# Patient Record
Sex: MALE | Race: WHITE | NOT HISPANIC OR LATINO | Employment: OTHER | URBAN - METROPOLITAN AREA
[De-identification: names, ages, dates, MRNs, and addresses within clinical notes are randomized per-mention and may not be internally consistent; named-entity substitution may affect disease eponyms.]

---

## 2018-12-11 ENCOUNTER — RECORDS - HEALTHEAST (OUTPATIENT)
Dept: LAB | Facility: CLINIC | Age: 63
End: 2018-12-11

## 2018-12-11 LAB
ALBUMIN SERPL-MCNC: 4 G/DL (ref 3.5–5)
ALP SERPL-CCNC: 51 U/L (ref 45–120)
ALT SERPL W P-5'-P-CCNC: 25 U/L (ref 0–45)
ANION GAP SERPL CALCULATED.3IONS-SCNC: 10 MMOL/L (ref 5–18)
AST SERPL W P-5'-P-CCNC: 24 U/L (ref 0–40)
BILIRUB SERPL-MCNC: 0.7 MG/DL (ref 0–1)
BUN SERPL-MCNC: 16 MG/DL (ref 8–22)
CALCIUM SERPL-MCNC: 9.9 MG/DL (ref 8.5–10.5)
CHLORIDE BLD-SCNC: 103 MMOL/L (ref 98–107)
CO2 SERPL-SCNC: 25 MMOL/L (ref 22–31)
CREAT SERPL-MCNC: 0.72 MG/DL (ref 0.7–1.3)
GFR SERPL CREATININE-BSD FRML MDRD: >60 ML/MIN/1.73M2
GLUCOSE BLD-MCNC: 102 MG/DL (ref 70–125)
LIPASE SERPL-CCNC: 27 U/L (ref 0–52)
POTASSIUM BLD-SCNC: 4.1 MMOL/L (ref 3.5–5)
PROT SERPL-MCNC: 6.8 G/DL (ref 6–8)
SODIUM SERPL-SCNC: 138 MMOL/L (ref 136–145)

## 2019-04-25 ENCOUNTER — RECORDS - HEALTHEAST (OUTPATIENT)
Dept: LAB | Facility: CLINIC | Age: 64
End: 2019-04-25

## 2019-04-26 LAB
ALBUMIN SERPL-MCNC: 4.4 G/DL (ref 3.5–5)
ALP SERPL-CCNC: 57 U/L (ref 45–120)
ALT SERPL W P-5'-P-CCNC: 24 U/L (ref 0–45)
ANION GAP SERPL CALCULATED.3IONS-SCNC: 13 MMOL/L (ref 5–18)
AST SERPL W P-5'-P-CCNC: 24 U/L (ref 0–40)
BILIRUB SERPL-MCNC: 0.6 MG/DL (ref 0–1)
BUN SERPL-MCNC: 25 MG/DL (ref 8–22)
CALCIUM SERPL-MCNC: 9.8 MG/DL (ref 8.5–10.5)
CHLORIDE BLD-SCNC: 107 MMOL/L (ref 98–107)
CHOLEST SERPL-MCNC: 209 MG/DL
CO2 SERPL-SCNC: 21 MMOL/L (ref 22–31)
CREAT SERPL-MCNC: 0.8 MG/DL (ref 0.7–1.3)
FASTING STATUS PATIENT QL REPORTED: NO
GFR SERPL CREATININE-BSD FRML MDRD: >60 ML/MIN/1.73M2
GLUCOSE BLD-MCNC: 122 MG/DL (ref 70–125)
HDLC SERPL-MCNC: 32 MG/DL
LDLC SERPL CALC-MCNC: 108 MG/DL
POTASSIUM BLD-SCNC: 3.7 MMOL/L (ref 3.5–5)
PROT SERPL-MCNC: 7.1 G/DL (ref 6–8)
PSA SERPL-MCNC: 0.7 NG/ML (ref 0–4.5)
SODIUM SERPL-SCNC: 141 MMOL/L (ref 136–145)
TRIGL SERPL-MCNC: 344 MG/DL
TSH SERPL DL<=0.005 MIU/L-ACNC: 1.28 UIU/ML (ref 0.3–5)

## 2019-04-29 LAB — 25(OH)D3 SERPL-MCNC: 39.7 NG/ML (ref 30–80)

## 2020-05-13 ENCOUNTER — RECORDS - HEALTHEAST (OUTPATIENT)
Dept: LAB | Facility: CLINIC | Age: 65
End: 2020-05-13

## 2020-05-13 LAB
ANION GAP SERPL CALCULATED.3IONS-SCNC: 9 MMOL/L (ref 5–18)
BUN SERPL-MCNC: 27 MG/DL (ref 8–22)
CALCIUM SERPL-MCNC: 9.5 MG/DL (ref 8.5–10.5)
CHLORIDE BLD-SCNC: 104 MMOL/L (ref 98–107)
CO2 SERPL-SCNC: 28 MMOL/L (ref 22–31)
CREAT SERPL-MCNC: 0.85 MG/DL (ref 0.7–1.3)
GFR SERPL CREATININE-BSD FRML MDRD: >60 ML/MIN/1.73M2
GLUCOSE BLD-MCNC: 94 MG/DL (ref 70–125)
POTASSIUM BLD-SCNC: 4.9 MMOL/L (ref 3.5–5)
SODIUM SERPL-SCNC: 141 MMOL/L (ref 136–145)

## 2020-09-29 ENCOUNTER — AMBULATORY - HEALTHEAST (OUTPATIENT)
Dept: SURGERY | Facility: CLINIC | Age: 65
End: 2020-09-29

## 2020-09-29 DIAGNOSIS — Z11.59 ENCOUNTER FOR SCREENING FOR OTHER VIRAL DISEASES: ICD-10-CM

## 2020-10-08 ENCOUNTER — RECORDS - HEALTHEAST (OUTPATIENT)
Dept: LAB | Facility: CLINIC | Age: 65
End: 2020-10-08

## 2020-10-08 LAB
ANION GAP SERPL CALCULATED.3IONS-SCNC: 11 MMOL/L (ref 5–18)
BUN SERPL-MCNC: 17 MG/DL (ref 8–22)
CALCIUM SERPL-MCNC: 9.5 MG/DL (ref 8.5–10.5)
CHLORIDE BLD-SCNC: 102 MMOL/L (ref 98–107)
CO2 SERPL-SCNC: 25 MMOL/L (ref 22–31)
CREAT SERPL-MCNC: 1.03 MG/DL (ref 0.7–1.3)
GFR SERPL CREATININE-BSD FRML MDRD: >60 ML/MIN/1.73M2
GLUCOSE BLD-MCNC: 85 MG/DL (ref 70–125)
POTASSIUM BLD-SCNC: 4.9 MMOL/L (ref 3.5–5)
SODIUM SERPL-SCNC: 138 MMOL/L (ref 136–145)

## 2020-10-09 RX ORDER — LOSARTAN POTASSIUM 50 MG/1
50 TABLET ORAL DAILY
Status: SHIPPED | COMMUNITY
Start: 2020-10-09 | End: 2022-08-19

## 2020-10-09 RX ORDER — ATORVASTATIN CALCIUM 20 MG/1
20 TABLET, FILM COATED ORAL AT BEDTIME
Status: SHIPPED | COMMUNITY
Start: 2020-10-09

## 2020-10-09 RX ORDER — GABAPENTIN 300 MG/1
300 CAPSULE ORAL 3 TIMES DAILY
Status: SHIPPED | COMMUNITY
Start: 2020-10-09

## 2020-10-09 RX ORDER — ATENOLOL 50 MG/1
50 TABLET ORAL DAILY
Status: SHIPPED | COMMUNITY
Start: 2020-10-09 | End: 2022-08-19

## 2020-10-09 ASSESSMENT — MIFFLIN-ST. JEOR
SCORE: 1912.3
SCORE: 1912.3

## 2020-10-12 ENCOUNTER — AMBULATORY - HEALTHEAST (OUTPATIENT)
Dept: LAB | Facility: CLINIC | Age: 65
End: 2020-10-12

## 2020-10-12 DIAGNOSIS — Z11.59 ENCOUNTER FOR SCREENING FOR OTHER VIRAL DISEASES: ICD-10-CM

## 2020-10-13 LAB
SARS-COV-2 PCR COMMENT: NORMAL
SARS-COV-2 RNA SPEC QL NAA+PROBE: NEGATIVE
SARS-COV-2 VIRUS SPECIMEN SOURCE: NORMAL

## 2020-10-14 ENCOUNTER — ANESTHESIA - HEALTHEAST (OUTPATIENT)
Dept: SURGERY | Facility: CLINIC | Age: 65
End: 2020-10-14

## 2020-10-14 ENCOUNTER — SURGERY - HEALTHEAST (OUTPATIENT)
Dept: SURGERY | Facility: CLINIC | Age: 65
End: 2020-10-14

## 2020-10-14 ENCOUNTER — HOSPITAL ENCOUNTER (INPATIENT)
Dept: MEDSURG UNIT | Facility: CLINIC | Age: 65
Discharge: HOME | End: 2020-10-17
Attending: ORTHOPAEDIC SURGERY | Admitting: ORTHOPAEDIC SURGERY

## 2020-10-14 ENCOUNTER — COMMUNICATION - HEALTHEAST (OUTPATIENT)
Dept: SCHEDULING | Facility: CLINIC | Age: 65
End: 2020-10-14

## 2020-10-14 DIAGNOSIS — M48.061 SPINAL STENOSIS OF LUMBAR REGION AT MULTIPLE LEVELS: ICD-10-CM

## 2020-10-14 RX ORDER — NAPROXEN 500 MG/1
500 TABLET ORAL 2 TIMES DAILY WITH MEALS
Refills: 0 | Status: SHIPPED
Start: 2020-10-19 | End: 2022-08-19

## 2020-10-14 ASSESSMENT — MIFFLIN-ST. JEOR
SCORE: 1889.17
SCORE: 1889.17

## 2020-10-15 ENCOUNTER — AMBULATORY - HEALTHEAST (OUTPATIENT)
Dept: OTHER | Facility: CLINIC | Age: 65
End: 2020-10-15

## 2020-10-15 RX ORDER — POLYETHYLENE GLYCOL 3350 17 G/17G
17 POWDER, FOR SOLUTION ORAL 2 TIMES DAILY
Refills: 0 | Status: SHIPPED | COMMUNITY
Start: 2020-10-15 | End: 2022-08-19

## 2020-10-15 RX ORDER — BACLOFEN 5 MG/1
5-10 TABLET ORAL EVERY 6 HOURS PRN
Qty: 30 TABLET | Refills: 0 | Status: SHIPPED | OUTPATIENT
Start: 2020-10-15 | End: 2022-08-19

## 2020-10-15 RX ORDER — OXYCODONE HYDROCHLORIDE 5 MG/1
5-10 TABLET ORAL EVERY 4 HOURS PRN
Qty: 42 TABLET | Refills: 0 | Status: SHIPPED | OUTPATIENT
Start: 2020-10-15 | End: 2022-08-19

## 2020-10-15 RX ORDER — AMOXICILLIN 250 MG
1 CAPSULE ORAL 2 TIMES DAILY
Refills: 0 | Status: SHIPPED | COMMUNITY
Start: 2020-10-15 | End: 2022-08-19

## 2020-10-15 RX ORDER — ACETAMINOPHEN 500 MG
500 TABLET ORAL EVERY 4 HOURS
Refills: 0 | Status: SHIPPED | COMMUNITY
Start: 2020-10-15

## 2020-10-16 ASSESSMENT — MIFFLIN-ST. JEOR
SCORE: 1885.09
SCORE: 1885.09

## 2021-05-26 ENCOUNTER — RECORDS - HEALTHEAST (OUTPATIENT)
Dept: ADMINISTRATIVE | Facility: CLINIC | Age: 66
End: 2021-05-26

## 2021-06-05 VITALS
WEIGHT: 244 LBS | WEIGHT: 244 LBS | HEIGHT: 70 IN | BODY MASS INDEX: 34.93 KG/M2 | BODY MASS INDEX: 34.93 KG/M2 | HEIGHT: 70 IN

## 2021-06-12 NOTE — PROGRESS NOTES
Physical Therapy       10/15/20 0993   Visit Specifics   Eval Type Initial eval   Inital PT Consult 10/15/20   Bed/Tabs/Pad Alarm Applied Not applicable  (handoff to OT)   Subjective Patient Comments Pleasant, cooperative.    General   Onset date 10/14/20   Additional Pertinent History s/p L3-4, 4-5 fusion and laminectomies    Chart Reviewed Yes   PT/OT Patient/Caregiver Stated Goals return home    Family/Caregiver Present Yes   Treatment Time   Gait Training 15   Precautions   Weight Bearing Status wbat    General Precautions Back brace when OOB;Spinal  (Corset brace, hemovac )   Home Living   Type of Home House   Home Layout One level;Performs ADLs on one level;Able to live on main level with bedroom/bathroom;Stairs to enter with rails  (2 PITO)   Bathroom Shower/Tub Walk-in shower   Bathroom Toilet Standard   Bathroom Equipment Toilet raiser   Mobility Equipment Walker-front wheeled;Cane   Prior Status   Independent With All ADL's/IADL's   Needs Assistance With Laundry   Prior Device Use None of the above   Indoor Mobility Independent   Lives With Spouse   Receives Help From Family   Device Used No   Cognition   Overall Cognitive Status WFL   RLE Assessment   RLE Assessment WFL   LLE Assessment   LLE Assessment WFL   Bed Mobility   Supine to Sit Stand by assistance;Log roll   Bed Mobility Comments Max cues for log roll technique.    Transfer    Sit To Stand CGA   Stand To Sit CGA   Transfer Comments Cues for proper hand placement and safety. Pt able to independently luis corset sitting eob.    Ambulation    Weight Bearing Status wbat    Distance (ft)  175', 50'    Assistance CGA   Assistive Device Rolling walker   Verbal Cues Attention to task;Attention to direction   Quality of Gait/Comment Slow, but steady gait.    Pattern Decreased pace   Endurance Deficit   Endurance Deficit No   Balance   Sitting Balance Standby   Standing Balance CGA   Plan   Treatment/Interventions Functional transfer training;Gait/stair  training;Home exercise program;Neuromuscular re-ed;Strengthening/ROM   PT Frequency 2x/day   Assessment   Prognosis Good   Problem List Decreased mobility   Barriers to Discharge None   Recommendation   PT Discharge Recommendation Home with assist   PT Equipment Recommendation Rolling walker / FWW   Treatment Suggestions for Next Session progress transfers, amb as antoinette, stairs    PT Care Plan REVIEWED DAILY Yes, goals remain appropriate   PT Handouts Given spine precautions     Valerie Francois, PT

## 2021-06-12 NOTE — ANESTHESIA CARE TRANSFER NOTE
Last vitals:   Vitals:    10/14/20 1634   BP: 106/63   Pulse: 66   Resp: 18   Temp: 36.1  C (96.9  F)   SpO2: 95%     Patient's level of consciousness is drowsy  Spontaneous respirations: yes  Maintains airway independently: yes  Dentition unchanged: yes  Oropharynx: oropharynx clear of all foreign objects    QCDR Measures:  ASA# 20 - Surgical Safety Checklist: WHO surgical safety checklist completed prior to induction    PQRS# 430 - Adult PONV Prevention: 4558F - Pt received => 2 anti-emetic agents (different classes) preop & intraop  ASA# 8 - Peds PONV Prevention: NA - Not pediatric patient, not GA or 2 or more risk factors NOT present  PQRS# 424 - Nora-op Temp Management: 4559F - At least one body temp DOCUMENTED => 35.5C or 95.9F within required timeframe  PQRS# 426 - PACU Transfer Protocol: - Transfer of care checklist used  ASA# 14 - Acute Post-op Pain: ASA14B - Patient did NOT experience pain >= 7 out of 10

## 2021-06-12 NOTE — ANESTHESIA PREPROCEDURE EVALUATION
Anesthesia Evaluation      Patient summary reviewed   No history of anesthetic complications     Airway   Mallampati: II  Neck ROM: full   Pulmonary - normal exam                          Cardiovascular - normal exam  (+) hypertension, ,      Neuro/Psych    (+) neuromuscular disease,      Endo/Other    (+) obesity,      GI/Hepatic/Renal    (+) GERD,             Dental - normal exam                        Anesthesia Plan  Planned anesthetic: general endotracheal    ASA 2   Induction: intravenous   Anesthetic plan and risks discussed with: patient  Anesthesia plan special considerations: antiemetics, dexmedetomidine  Post-op plan: routine recovery

## 2021-06-12 NOTE — PROGRESS NOTES
Orthopedic Progress Note      Assessment: 3 Days Post-Op  S/P LEFT LUMBAR 3-4 AND RIGHT LUMBAR 4-5 TRANSFORAMINAL LUMBAR INTERBODY FUSION WITH LAMINECTOMY    Plan:   - Continue PT/OT  - Weightbearing status: WBAT  -Lumbar corset when out of bed   -Discontinue drain per parameters/postop orders    - Anticoagulation:  SCDs, sarai stockings and early ambulation.  - Discharge planning: Plan for discharge home today if pain, bowels, and PT are going well.        Subjective:  Pain: mild  Nausea, Vomiting:  No  Lightheadedness, Dizziness:  No  Neuro:  Patient denies new onset numbness or paresthesias    Patient is resting in bed.  Patient reports he had a BM.  Still some bowel irritation, but overall feeling pretty good.  He feels he is doing well with PT.  Reports little more numbness feeling involving left thigh.       Objective:  Vitals:    10/17/20 0406   BP: 135/86   Pulse: 75   Resp: 18   Temp: 98.5  F (36.9  C)   SpO2: 96%     The patient is A&Ox3. Appears comfortable.   Sensation is intact.  Dorsiflexion and plantar flexion is intact.  Dorsalis pedis pulse intact.  Calves are soft and non-tender. Negative Prateek's.  The incision is covered. Dressing C/D/I.  Dressing looks good.  No drainage.      Drain is in place.      Pertinent Labs   Lab Results: personally reviewed.   No results found for: INR, PROTIME  No results found for: WBC, HGB, HCT, MCV, PLT  Lab Results   Component Value Date     10/08/2020    K 4.9 10/08/2020     10/08/2020    CO2 25 10/08/2020         Report completed by:  Tomi Dueñas PA-C  Date: 10/17/2020  Time: 6:53 AM

## 2021-06-12 NOTE — PLAN OF CARE
Problem: Occupational Therapy  Goal: OT Goals  Description: Acute care goals to be addressed by 10/16/2020 in order to maximize I with ADLs  -Pt will complete LB dressing using AE PRN to increase I with ADLs  -Pt will complete functional mobility in room and bath with SBA using FWW PRN to increase I with ADLs  Goals initiated by Selene Reyna on 10/15/2020    Outcome: Completed    Occupational Therapy Discharge Summary    Date of OT Discharge: 10/15/2020    Refer to daily doc flowsheet for equipment issued and current functional status.  Discharge Destination: Remains in hospital  Discharge Comments: Recommend home with family assist as needed.  OT goals met.      10/15/2020 by Selene Reyna

## 2021-06-12 NOTE — OP NOTE
DATE OF SERVICE: 10/14/2020     PREOPERATIVE DIAGNOSES:   1.  Severe right L4 foraminal stenosis  2.  Severe left L3 foraminal stenosis  3.  Central spinal stenosis, L3-L5  4.  Degenerative disc disease   5.  Failure of conservative measures      POSTOPERATIVE DIAGNOSES:   1.  Severe right L4 foraminal stenosis  2.  Severe left L3 foraminal stenosis  3.  Central spinal stenosis, L3-L5  4.  Degenerative disc disease   5.  Failure of conservative measures      PROCEDURE:   1. Right-sided transforaminal/transfacet decompression of the exiting L4 and traversing L5 nerve roots.   2. Transforaminal lumbar interbody fusion L4-5 with autograft bone, [employer] Elevate interbody 28 x 8 to 12.   3. Left-sided transforaminal/transfacet decompression of the exiting L3 and traversing L4 nerve roots.   4. Transforaminal lumbar interbody fusion L4-5 with autograft bone, Medtronic Elevate interbody 28 x 8 to 12.   5. Posterior lateral fusion bilaterally at L4-5 and bilateral posterior lateral fusion at L3-4.   6. Segmental pedicle screw fixation in the pedicles of L3, L4, and L5 bilaterally  7. Complete laminectomy with lateral recess decompression from L3-5   8. O arm with intra operative navigation     [employer] navigated pedicle screws systems.      SURGEON: Dr. Jadon Johnson.      ASSISTANT: Kirt Mayberry PA-C, who was needed for patient positioning, soft tissue   retraction, patient safety and assistance with closure of the wound.  He was vital in the protection of the nerve roots as well as the dura.  This could only be performed by a surgical PA trained in spine     ESTIMATED BLOOD LOSS: 1200 mL, returned 560 mL via Cell Saver      DRAINS: Hemovac      COMPLICATIONS: None perceived.      SPECIMENS SENT: None.      HISTORY OF PRESENT ILLNESS AND INDICATIONS FOR PROCEDURE:   This is a pleasant 65 year old male that presented with severe bilateral leg pain and buttock pain, right greater than left.  MRI  demonstrated degenerative disc disease with severe spinal stenosis from L3-L5 with right L4 and left L3 foraminal stenosis.  He also had severe multi level degenerative disc disease.  He underwent non-operative treatment with injections as well as therapy with no improvement long term.  We discussed because of this that he was a candidate for surgery.  I discussed that a fusion would give him more reliable results given his severe degenerative disc disease with foraminal collapse.  He wished to proceed with the fusion.  We discussed the risks and benefits which include but are not limited to bleeding, infection, damage to the nerve or dura, failure of procedure to provide pain relief, foot drop, iatrogenic instability, pseudoarthrosis, need for additional procedures, and risks associated with anesthesia.  The intention of the surgery is to treat his leg pain and will not reliably treat any back pain. He was completely clear on this.  Following this, he would like to proceed with the fusion.       DESCRIPTION OF PROCEDURE     Therefore, the patient was brought to the operating room. He was intubated via general endotracheal anesthetic and placed on a Nima table with a Gualberto frame, care taken to pad all of his bony prominences. Pause for the Cause was performed correctly identifying the patient, procedure, proposed plan and radiographs and all were in agreement. We then dissected down from the L3 to the L5 hemilamina bilaterally. We dissected over the facet joints at L3-4 and L4-5 bilaterally.  In addition to this, the transverse process was visualized at the L3, L4, and L5 level.  O arm was brought in and a tracer was placed on the L5 spinous process.  An intraoperative CT scan was taken.       We then turned our attention to placing pedicle screws, first using a Midas Madi bur, then a pedicle probe, then a Guardado probe to palpate the cannulated pedicle. We then tapped the holes to 5.5 at L5 and 4.5 mm at L3 and L4.   We then used the Guardado probe again to palpate all 4 quadrants of our cannulated pedicle. We then placed 5.5x50 screws bilaterally at L3 and 5.5x50 screws bilaterally at L4 and finally 6.5x50 screws bilaterally at L5.   A subsequent O arm spin was performed and demonstrated the screws in good position.     We then turned our attention to the decompression and interbody.  A laminotomy was made.  The right L4 pars was scored allowing for the removal of the descending articular process of L4 along with the ascending articular process of L5. This gave us our transforaminal/transfacet decompression of the exiting L4 and traversing L5 nerve root.  We followed that L4 nerve root all the way out. There was clearly no continued neural compression.  At the disk space level, we performed a box annulotomy at L4-5, used a combination of ODC curettes, pituitary rongeurs and Kerrison rongeurs to remove the disk material. We took great care not to dig into the endplates. When all of the disk material that we could remove was taken out, we gently scored the endplates and placed a small Magnifuse along with a piece of felice and 6 cc of autograft bone in the interbody space. We then placed the interbody graft,  a 28 mm length and 8 expanding to 12 mm height, Medtronic Elevate cage. This had good fit and fill.       We then turned our attention to the decompression and interbody on the left.  A laminotomy was made.  The left L3 pars was scored allowing for the removal of the descending articular process of L3 along with the ascending articular process of L4. This gave us our transforaminal/transfacet decompression of the exiting L3 and traversing L4 nerve root.  We followed that L3 nerve root all the way out. There was clearly no continued neural compression.  At the disk space level, we performed a box annulotomy at L3-4, used a combination of ODC curettes, pituitary rongeurs and Kerrison rongeurs to remove the disk material. We took  great care not to dig into the endplates. When all of the disk material that we could remove was taken out, we gently scored the endplates and placed a piece of felice and 6 cc of autograft bone in the interbody space. We then placed the interbody graft,  a 28 mm length and 7 expanding to 11 mm height, Medtronic Elevate cage. This had good fit and fill.       A laminotomy was then made on the left side at L4 and on the right at L3.  The spinous process of L3 and L4 was removed in a piecemeal fashion.  This was then followed by removal of the ligamentum and epidural fat.  From here, we removed the spinous process, bilateral lamina, and medial facets from L3-L5.  The ligamentum was completely freed and removed.  This allowed for a complete central and lateral recess decompression.  There was a fair amount of bleeding.  This was controlled as best as possible with Gel Foam, Surgiflo, and electrocautery.        We then decorticated our transverse processes on the left at L3-L5 and at L3-4 on the right as well as the facet joint and placed additional autograft in the defect.  This was mixed with a medium Felice puck  along with the other Magnifuse bag to assist in posterolateral fusion.       We then placed our connectors and rods and tightened to the 's specifications. We irrigated the wound copiously. Vancomycin was placed over the hardware.  2 Hemovac drains were then placed. The fascia was closed with #1 Vicryl, subcutaneous tissue with 2-0 Vicryl, skin with a 3-0 monocryl.  A sterile dressing was applied. The patient tolerated the procedure well. There were no apparent complications.      He will be weightbearing as tolerated bilateral lower extremities with strict instructions to avoid bending, lifting and twisting over the next 6 weeks. He may have a corset type brace for comfort and have early mobilization and CIARAN stockings for his DVT prophylaxis. He will have 2 grams of Ancef IV q.8 hours x2 doses  for antibiotics or until his drain is removed. Return to see me in 2 weeks, sooner if there are any problems, questions or concerns.     Hardware used:  Medtronic Solera screws and rods with end caps  Medtronic Elevate cage

## 2021-06-12 NOTE — PROGRESS NOTES
Occupational Therapy     10/15/20 1000   Visit Specifics   Eval Type Inital eval   Inital OT Consult  10/15/20   Bed/Tabs/Pad Alarm Applied Not applicable   Treatment Time   ADL Training 25   General   Onset date 10/14/20   Additional Pertinent History spine surgery   Chart Reviewed Yes   PT/OT Patient/Caregiver Stated Goals home   Family/Caregiver Present Yes   Precautions   Weight Bearing Status WBAT   General Precautions Back brace when OOB   Home Living   Type of Home House   Home Layout Able to live on main level with bedroom/bathroom   Bathroom Shower/Tub Walk-in shower   Bathroom Toilet Standard   Bathroom Equipment Toilet raiser   Mobility Equipment Walker-front wheeled   Prior Status   Independent With All ADL's/IADL's   Needs Assistance With Laundry   Lives With Spouse   Receives Help From Family   ADL   Grooming Wash/Dry hands   Wash/Dry Hands SBA;Standing   Toileting All toileting   All Toileting SBA   Kitchen/Functional Mobility SBA;Retrieved from upper cupboard;Retrieved from lower cupboard;Retrieved from fridge;Along counter top   ADL Comments Educated on spine precautions   Activity Tolerance   Endurance Good   Balance   Sitting Balance Independent   Standing Balance Standby   Functional Transfers   Functional Transfers Chair Transfers;Toilet Transfers;Tub Transfers;Car Transfers   Chair Transfers SBA   Toilet Transfers SBA   Tub Transfers SBA   Car Transfers SBA   Transfer Cues Technique;Correct hand placement   Transfer Deficits Pain   Ambulation   Location In clinic;To clinic;In room;To bathroom   Assistance SBA;CGA   Device Rolling Walker   Cognition   Overall Cognitive Status WFL   RUE Assessment   RUE Assessment WFL   LUE Assessment   LUE Assessment WFL   Assessment   Assessment Decreased ADL status;Decreased funtional mobility   Prognosis Good   Treatment/Interventions ADL training;Functional transfer training   OT Frequency 2x/day   Goal Formulation Patient   Recommendations   OT Discharge  Recommendation Home with family support/assistance   Treatment Suggestions for Next Session dressing, bed mobility   OT Care Plan REVIEWED DAILY Yes, goals remain appropriate

## 2021-06-12 NOTE — PLAN OF CARE
Physical Therapy Discharge Summary    Date of PT Discharge: 10/20/2020  Recommended Equipment: FWW  Discharge Destination: Home with assist from family as needed.  Discharge Comments:     Please note that if goals are not fully met the patient is making progress towards established goals, which is documented in flowsheet notes. If further therapy is recommended it is related to documented deficits, and is necessary to maximize functional independence in order for patient to return to previous level of function.      10/17/2020 by Aliya Talamantes, PT    Problem: Physical Therapy  Goal: PT Goals  Description: Patient will demonstrate the following by 10/19/2020, in order to maximize independence with functional mobility to facilitate safe discharge:   -Supine<>sit with head of bed flat, no rail, I, via log roll  -Sit<>stand with fww assistive device, I  -Ambulate 300 feet with fww assistive device, SBA   -Negotiate 2 stairs with single rail, SBA, in order to access home.  -Patient able to verbalize 3/3 spine precautions.  -Improve gait speed by .13 m/s with appropriate AD as needed.    Goals entered on 10/15/2020 by Valerie Francois, PT      Outcome: Completed

## 2021-06-12 NOTE — ANESTHESIA POSTPROCEDURE EVALUATION
Patient: Jamel Noel  Procedure(s):  LEFT LUMBAR 3-4 AND RIGHT LUMBAR 4-5 TRANSFORAMINAL LUMBAR INTERBODY FUSION WITH LAMINECTOMY (Bilateral)  Anesthesia type: general    Patient location: PACU  Last vitals:   Vitals Value Taken Time   BP 99/73 10/14/20 1805   Temp 36.6  C (97.8  F) 10/14/20 1805   Pulse 85 10/14/20 1805   Resp 14 10/14/20 1805   SpO2 95 % 10/14/20 1805     Post vital signs: stable  Level of consciousness: awake and responds to simple questions  Post-anesthesia pain: pain controlled  Post-anesthesia nausea and vomiting: no  Pulmonary: unassisted, return to baseline, face mask  Cardiovascular: stable and blood pressure at baseline  Hydration: adequate  Anesthetic events: no    QCDR Measures:  ASA# 11 - Nora-op Cardiac Arrest: ASA11B - Patient did NOT experience unanticipated cardiac arrest  ASA# 12 - Nora-op Mortality Rate: ASA12B - Patient did NOT die  ASA# 13 - PACU Re-Intubation Rate: ASA13B - Patient did NOT require a new airway mgmt  ASA# 10 - Composite Anes Safety: ASA10A - No serious adverse event    Additional Notes:

## 2021-06-12 NOTE — PLAN OF CARE
Problem: Potential for Ineffective Pain control  Goal: Patient will demonstrate adequate pain control  Outcome: Progressing     Problem: Impaired Physical Mobility  Goal: Mobility is maintained at optimum level per patient situation  Outcome: Progressing     Problem: Potential for respiratory complication  Goal: Patient will maintain patent airway and return to respiratory baseline post-op  Outcome: Progressing  Pt's pain is managed with iv Dilaudid 0.5 mg x 1, plus scheduled Tylenol and Oxycodone. PVR done.

## 2021-06-12 NOTE — PLAN OF CARE
Care Plan  Care Management (CM)      Care Management Goals of the Day: assessment, care progression and discharge planning    Care Progression Reviewed With: Charge Nurse, Medical Doctor (MD), Health Unit Coordinator, (HUC) Nurse Care Manager (RNCM),  Social Work Care Manager (SWCM)    Barriers to Discharge: POD 1 spine-ortho. Drain, pain, PVRs    Discharge Disposition: home    Expected Discharge Date:10/17    Transportation: wife, [place]      Care Coordination Narrative:Home with wife. Patient agrees with home care if recommended.    Care Manager to continue to follow.       Abbreviation  Code:  [facility] Wheel[facility] ([facility]), [facility] ([facility]), Patient (pt), Transitional Care Unit (TCU), Skilled Nursing Facility (SNF), Physical Therapy (PT), Occupational Therapy (OT), Speech Therapy (ST TX), Respiratory Therapy (RT)

## 2021-06-12 NOTE — PLAN OF CARE
Problem: Pain  Goal: Patient's pain/discomfort is manageable  Outcome: Progressing     Problem: Daily Care  Goal: Daily care needs are met  Outcome: Progressing     Problem: Psychosocial Needs  Goal: Demonstrates ability to cope with hospitalization/illness  Outcome: Progressing   Pt's pain is effectively managed with scheduled Tylenol and Oxycodone. Up with standby assist of one and ambulating with walker. No other concerns noted or verbalized. Will continue to monitor.

## 2021-06-12 NOTE — DISCHARGE SUMMARY
ORTHOPEDIC DISCHARGE SUMMARY       Jamel Noel,  1955, MRN [id number]    Admission Date: 10/14/2020  Admission Diagnoses: Lumbar spinal stenosis [M48.061]     Discharge Date: 10/17/2020     Post-operative Day:  POD #3    Reason for Admission: The patient was admitted for the following:  LEFT LUMBAR 3-4 AND RIGHT LUMBAR 4-5 TRANSFORAMINAL LUMBAR INTERBODY FUSION WITH LAMINECTOMY (Bilateral)    BRIEF HOSPITAL COURSE   This 65 y.o. male underwent the aforementioned procedure with Dr. Johnson on 10/14/2020. There were no intraoperative complications and the patient was transferred to the recovery room and later the orthopedic unit in stable condition. Once the patient reached the orthopedic floor our orthopedic pain protocol was implemented along with the following:    Anticoagulation Medications:    Therapy: PT/OT  Activity:  WBAT.      Bracing:  Lumbar corset brace when out of bed.      Consultations during Admission: Hospitalist service for medical management       COMPLICATIONS/SIGNIFICANT FINDINGS    None     DISCHARGE INFORMATION   Condition at discharge: good  Discharge destination: Home or Self Care  Patient was seen by Jostin Dueñas PA-C on the date of discharge.    FOLLOW UP CARE   Follow up with orthopedics in 2 weeks or sooner should the need arise. Ortho will continue to manage pain control, post op anticoagulation and incision care.     Follow up with your PCP in 6-10 days (based on your readmission risk score) for medical management of chronic medical problems and to evaluate for post op medical complications including constipation, nausea/vomiting, DVT/PE, anemia, changes in blood pressure, fevers/chills, urinary retention and atelectasis/pneumonia.     Subjective       Physical Exam       Vitals:    10/17/20 0805   BP: 120/84   Pulse: 78   Resp: 18   Temp: 98.3  F (36.8  C)   SpO2: 95%       Pertinent Results at Discharge   No results found for: HGB, INR, PLT    Medications at Discharge       Jamel Noel   Home Medication Instructions BRITTANY:[id number]    Printed on:11/05/20 0921   Medication Information                      acetaminophen (TYLENOL) 500 MG tablet  Take 1 tablet (500 mg total) by mouth every 4 (four) hours.             atenoloL (TENORMIN) 50 MG tablet  Take 50 mg by mouth daily.             atorvastatin (LIPITOR) 20 MG tablet  Take 20 mg by mouth at bedtime.             baclofen (LIORESAL) 5 mg tablet  Take 1-2 tablets (5-10 mg total) by mouth every 6 (six) hours as needed.             gabapentin (NEURONTIN) 300 MG capsule  Take 300 mg by mouth 3 (three) times a day.             losartan (COZAAR) 50 MG tablet  Take 50 mg by mouth daily.             naproxen (NAPROSYN) 500 MG tablet  Take 1 tablet (500 mg total) by mouth 2 (two) times a day with meals.             omeprazole (PRILOSEC) 20 MG capsule  Take 20 mg by mouth daily before breakfast.             oxyCODONE (ROXICODONE) 5 MG immediate release tablet  Take 1-2 tablets (5-10 mg total) by mouth every 4 (four) hours as needed.             polyethylene glycol (MIRALAX) 17 gram packet  Take 1 packet (17 g total) by mouth 2 (two) times a day.             senna-docusate (PERICOLACE) 8.6-50 mg tablet  Take 1 tablet by mouth 2 (two) times a day.                 Problem List   Principal Problem:    Spinal stenosis of lumbar region at multiple levels        Tomi Dueñas PA-C  Date: 11/5/2020  Time: 9:21 AM

## 2021-06-12 NOTE — PLAN OF CARE
Problem: Pain  Goal: Patient's pain/discomfort is manageable  Outcome: Progressing   Pain has been managed with scheduled tylenol and oxycodone.  Surgical pain increases with movement.  Problem: Blood pressure is elevated above 140 over 90 mmHg  Goal: Blood pressure (BP) is within acceptable limits  Outcome: Progressing     Problem: Pain  Goal: Patient's pain/discomfort is manageable  Outcome: Progressing     Problem: Safety  Goal: Patient will be injury free during hospitalization  Outcome: Progressing   Pt was up in chair and complained of being very dizzy.  Bp at that time was 76/46. Pt was put back to bed.  15 minutes later patient stated he was no longer dizzy and bp at this time was 126/81.  Did ambulate with therapy 45 minutes later with no further episodes.    Problem: Daily Care  Goal: Daily care needs are met  Outcome: Progressing     Problem: Psychosocial Needs  Goal: Demonstrates ability to cope with hospitalization/illness  Outcome: Progressing  Goal: Collaborate with patient/family/caregiver to identify patient specific goals for this hospitalization  Outcome: Progressing     Problem: Discharge Barriers  Goal: Patient's discharge needs are met  Outcome: Progressing     Problem: Knowledge Deficit  Goal: Patient/family/caregiver demonstrates understanding of disease process, treatment plan, medications, and discharge instructions  Outcome: Progressing     Problem: Potential for Falls  Goal: Patient will remain free of falls  Outcome: Progressing     Problem: Pain  Goal: Patient's pain/discomfort is manageable  Outcome: Progressing

## 2021-06-12 NOTE — PROGRESS NOTES
Hospitalist Progress Note     Assessment/Plan:         Active Problem:     Principal Problem:    Spinal stenosis of lumbar region at multiple levels  -Plan per orthopedic spine surgery     essential hypertension:  -Continue losartan and atenolol as an outpatient.      Constipation:  -As needed bowel regimen.    Hyperlipidemia:  -Continue statin    Stable to discharge from internal medicine standpoint.    Subjective:     Doing well, pain control, constipation resolved after enema.  Denies any new and acute complaints.    Review of systems:     Please see Subjective.    Current Medications:     Scheduled Meds:  Continuous Infusions:  PRN Meds:.    Objective:       Vital signs in last 24 hours:        Weight: (!) 244 lb (110.7 kg)    Physical Exam:     General appearance: Alert, Awake, No distress   Head & Neck Atraumatic, supple, no tracheal deviation   ENT  PER, conjunctiva clear, no scleral icterus, EOMI, no nasal discharge   Resp:  Breathing unlabored       GI:  Nondistended   MSK: No swelling, or tenderness   Neuro:  Alert and oriented ×3     Intake/Output this shift:     I/O last 3 completed shifts:  In: 840 [P.O.:800]  Out: 593 [Urine:400; Drains:193]    Pertinent Labs:     Lab Results: personally reviewed.     Pertinent Radiology:       Advanced Care Planning:     Barrier to discharge: None    Campbell Joshi M.D.  [facility]  Internal Medicine

## 2021-06-12 NOTE — PROGRESS NOTES
[facility]   Spine Progress Note - Lumbar    Post-operative Day: 1 Day Post-Op    LEFT LUMBAR 3-4 AND RIGHT LUMBAR 4-5 TRANSFORAMINAL LUMBAR INTERBODY FUSION WITH LAMINECTOMY    Subjective:    Pain: mild  Pain location: surgical site  Chest pain, SOB:  No  Nausea/vomiting:  no nausea and no vomiting  Neuro:  No numbness, tingling, or weakness reported in bilateral LE      Objective:    Vital signs in last 24 hours  Temp:  [96.9  F (36.1  C)-98.9  F (37.2  C)] 98.2  F (36.8  C)  Heart Rate:  [66-91] 77  Resp:  [10-18] 18  BP: ()/(47-89) 126/81  Wt Readings from Last 1 Encounters:   10/14/20 (!) 244 lb 14.4 oz (111.1 kg)     Temp Readings from Last 1 Encounters:   10/15/20 98.2  F (36.8  C) (Oral)     BP Readings from Last 1 Encounters:   10/15/20 126/81     Pulse Readings from Last 1 Encounters:   10/15/20 77       Wound status: incisions are clean dry and intact. Yes   Circulation: Dorsalis pedis pulse 2+ bilaterally  Motor:  Intact strength for hip flexors and adductors, quads, AT, EHL, and gastrocs bilateral LE  Sensation: Intact sensation bilateral LE L2-S1, no LE swelling  Calf tenderness:  bilateral  no tenderness      Pertinent Labs     Lab Results: personally reviewed.   No results found for: WBC, HGB, HCT, MCV, PLT           Plan: Anticoagulation protocol:    Mechanical and/or ambulation              Pain medications:  oxycodone or tylenol            Weight bearing status:  WBAT            Disposition: Home in 1-2 days pending pain control and therapy goals.             Continue cares and rehabilitation.  Lumbar corset when out of bed.            Discontinue drain per parameters            Continue bladder and bowel protocol.            Corset on when OOB.      Report completed by:  Ryan Mayberry  Date: 10/15/2020  Time: 3:19 PM

## 2021-06-12 NOTE — PROGRESS NOTES
Hospitalist Progress Note    Assessment/Plan  65-year-old male with past medical history of hypertension, hyperlipidemia, GERD, severe lumbar stenosis, obesity who was admitted and underwent LEFT LUMBAR 3-4 AND RIGHT LUMBAR 4-5 TRANSFORAMINAL LUMBAR INTERBODY FUSION WITH LAMINECTOMY.  Hospital medicine has been consulted for evaluation of hypertension and possible postoperative ileus    1.  Essential hypertension.  Blood pressure well controlled on losartan and atenolol  2.  Constipation, possible postoperative ileus.  Patient is already on scheduled Nora-Colace and MiraLAX.  Received MOM today.  We will give enema x1 now  3.  Hyperlipidemia on statin  4.  Severe lumbar stenosis, status post LEFT LUMBAR 3-4 AND RIGHT LUMBAR 4-5 TRANSFORAMINAL LUMBAR INTERBODY FUSION WITH LAMINECTOMY.  5.  Obesity Body mass index is 35.52 kg/m .      Barriers to Discharge : Constipation, pain control  Anticipated Discharge : TBD  Disposition : Home        Subjective  Patient reports feeling constipated.  Complains of abdominal distention and pain mostly in the epigastric area.  No nausea or vomiting but has had very poor oral intake due to abdominal distention.  Has been passing gas  Incisional pain is controlled on scheduled oxycodone and Tylenol.  No no tingling or numbness      Objective    Vital signs in last 24 hours  Temp:  [98.3  F (36.8  C)-99  F (37.2  C)] 98.3  F (36.8  C)  Heart Rate:  [71-82] 78  Resp:  [17-20] 20  BP: (104-126)/(68-82) 126/73 97% O2 Device: None (Room air) O2 Flow Rate (L/min): 0 L/min  Weight:   (!) 244 lb (110.7 kg) Weight change:     Intake/Output last 3 shifts  I/O last 3 completed shifts:  In: 400 [P.O.:360]  Out: 325 [Urine:250; Drains:75]  Intake/Output this shift:  No intake/output data recorded.    Physical Exam:  GENERAL: No acute distress  HEENT: Moist mucous membranes  CARDIAC: Regular rate & rhythm, S1 & S2 normal.  No gallops or murmurs. No edema  LUNGS: Clear to auscultation  bilaterally  ABDOMEN: Obese, mildly distended, nontender, bowel sounds present all quadrants  NEURO: Grossly intact      Pertinent Labs   Lab Results: personally reviewed.         Invalid input(s): LABALBU        Invalid input(s):  EOSPCT        Medications  Scheduled Meds:    acetaminophen  500 mg Oral Q4H     atenoloL  50 mg Oral DAILY     atorvastatin  20 mg Oral QHS     gabapentin  300 mg Oral TID     losartan  50 mg Oral DAILY     multivitamin therapeutic  1 tablet Oral DAILY     omeprazole  20 mg Oral QAM (0630)     oxyCODONE  5 mg Oral Q4H     polyethylene glycol  17 g Oral BID     senna-docusate  1 tablet Oral BID     [START ON 10/17/2020] sodium phosphates 133 mL  1 enema Rectal Once     Continuous Infusions:    dexmedetomidine infusion orderable (PRECEDEX) Stopped (10/14/20 1601)     phenylephrine       PRN Meds:.baclofen, benzocaine-menthoL, bisacodyL, HYDROmorphone, HYDROmorphone, loratadine, magnesium hydroxide, naloxone **OR** naloxone, ondansetron **OR** ondansetron, oxyCODONE    Pertinent Radiology   Radiology Results: Personally reviewed image/s  Results for orders placed or performed during the hospital encounter of 10/14/20   XR Crosstable Lateral Lumbar Spine Anna    Impression    A single crosstable lateral image of the lumbar spine is presented.    Nomenclature is based on 5 lumbar type vertebral bodies.    A curved-tipped metallic instrument is directed at the L3-L4 level from a posterior approach.          Advanced Care Planning:  Treatment/Discharge Planning discussed with the patient and DAISY Alejandre MD  Internal Medicine Hospitalist  10/16/2020

## 2021-06-12 NOTE — PROGRESS NOTES
Pharmacy Note - Admission Medication History  Pertinent Provider Information: Only medication taken this morning was Tramadol. Patient has been holding Naproxen for 1 week.    ______________________________________________________________________  Prior To Admission (PTA) med list completed and updated in EMR.     PTA Med List   Medication Sig Note Last Dose     atenoloL (TENORMIN) 50 MG tablet Take 50 mg by mouth daily.  10/13/2020 at PM     atorvastatin (LIPITOR) 20 MG tablet Take 20 mg by mouth at bedtime.  10/13/2020 at PM     gabapentin (NEURONTIN) 300 MG capsule Take 300 mg by mouth 3 (three) times a day.  10/13/2020 at PM     losartan (COZAAR) 50 MG tablet Take 50 mg by mouth daily.  10/13/2020 at PM     naproxen (NAPROSYN) 500 MG tablet Take 500 mg by mouth 2 (two) times a day with meals. 10/14/2020: Patient has been holding dose for 1 week Past Week     omeprazole (PRILOSEC) 20 MG capsule Take 20 mg by mouth daily before breakfast. 10/14/2020: Last taken 2 days ago Past Week     traMADoL (ULTRAM) 50 mg tablet Take 50 mg by mouth every 6 (six) hours.  10/14/2020 at 0400     [DISCONTINUED] atorvastatin (LIPITOR) 20 MG tablet Take 20 mg by mouth at bedtime.         Information source(s): Patient  Method of interview communication: in-person  Patient was asked about OTC/herbal products specifically.  PTA med list reflects this.  Based on the pharmacist s assessment, the PTA med list information appears reliable  Allergies were reviewed, assessed, and updated with the patient.    Patient does not use any multi-dose medications prior to admission.   Thank you for the opportunity to participate in the care of this patient.    Liliana Diaz, Pharmacy Student     10/14/2020     9:39 AM

## 2021-06-12 NOTE — PLAN OF CARE
Alert and oriented x4. No drifts. Strong in all extremities. Up with PT today. Walked in the berman resulting in pain. 5 mg oxycodone PO given with good relief. Hemovac drain taken out. Pt is dressed and ready for discharge. Went over discharge paperwork and medications. Pt received balcofen and oxycodone from [facility]. Pt left to  [facility] with daughter Georgette. Wife is picking up.

## 2021-06-12 NOTE — PROGRESS NOTES
Spiritual Care Note    Spiritual Assessment:   made an in person visit with patient's wife this morning; patient unavailable. Wife shares feeling really good and pleased about how her  his doing and feeling hopeful for a good recovery. Wife appreciative of  visit and knowing of her availability. No concerns noted.     Care Provided: Words of support provided.     Plan of Care: Spiritual care will continue to follow as part of patient's care team.     NARAYAN Fuentes, BCC

## 2021-06-12 NOTE — PLAN OF CARE
Problem: Blood pressure is elevated above 140 over 90 mmHg  Goal: Blood pressure (BP) is within acceptable limits  Outcome: Progressing     Problem: Pain  Goal: Patient's pain/discomfort is manageable  Outcome: Progressing     Problem: Safety  Goal: Patient will be injury free during hospitalization  Outcome: Progressing     Problem: Daily Care  Goal: Daily care needs are met  Outcome: Progressing     Problem: Psychosocial Needs  Goal: Demonstrates ability to cope with hospitalization/illness  Outcome: Progressing  Goal: Collaborate with patient/family/caregiver to identify patient specific goals for this hospitalization  Outcome: Progressing     Problem: Discharge Barriers  Goal: Patient's discharge needs are met  Outcome: Progressing     Problem: Knowledge Deficit  Goal: Patient/family/caregiver demonstrates understanding of disease process, treatment plan, medications, and discharge instructions  Outcome: Progressing     Problem: Potential for Falls  Goal: Patient will remain free of falls  Outcome: Progressing     Problem: Potential for Ineffective Pain control  Goal: Patient will demonstrate adequate pain control  Outcome: Progressing     Problem: Potential for Knowledge Deficit  Goal: Patient/family/caregiver demonstrate an understanding of disease process, treatment      plan, education, medications, and discharge instructions  Description: including (but not limited) to incision care, mobility, bracing, elimination, nutrition, and pain management.  Outcome: Progressing     Problem: Impaired Physical Mobility  Goal: Mobility is maintained at optimum level per patient situation  Outcome: Progressing     Problem: Potential for Neurovascular/Peripheral Vascular Impairment  Goal: Patient will maintain function/sensation in all extremities per patient situation  Outcome: Progressing     Problem: Potential for Fluid Volume Deficit/Overload  Goal: Patient will demonstrate adequate fluid balance as evidenced by  stable vitals, palpable pulses of good quality, normal skin turgor, moist mucous membranes and individually appropriate urinary output  Outcome: Progressing     Problem: Potential for Infection  Goal: Patient remains infection free  Outcome: Progressing     Problem: Potential for bowel or bladder impairment  Goal: Elimination patterns remain at baseline or improves  Outcome: Progressing     Problem: Potential for DVT/VTE  Goal: Patient will remain free from DVT/VTE  Outcome: Progressing     Problem: Potential for respiratory complication  Goal: Patient will maintain patent airway and return to respiratory baseline post-op  Outcome: Progressing   Pt had a good shift. PRN oxy 10 mg given x1. Pt voiding well. Abdomen distended. Pt c/o of sharp pain to abdomen when eating. Writer notified [facility]. PA will see pt this evening. New order obtained for MOM. Pt had not had a BM since day of surgery. Incision intact. Hemovac output: 20 ml. VSS. Wife present all shift- appropriate. Fall precautions in place. Will continue to monitor closely.

## 2021-06-12 NOTE — PROGRESS NOTES
"S:  Pt seen at [facility], room 5036, 10/14/20 @ 1930 for a corset.  Pt expecting a spinal brace.    O: 66 y/o 5'9\", 244 lb pt. awake, oriented, resting supine.    A: S/P LEFT LUMBAR 3-4 AND RIGHT LUMBAR 4-5 TRANSFORAMINAL LUMBAR INTERBODY FUSION WITH LAMINECTOMY 2/2 spinal stenosis.  P:  Fit/delivery of XLG Quickdraw RAP LSO w/ pt supine.  Pt log rolled to don orthosis; evaluated supine only.  Donning, doffing, fitting parameters, ROM restrictions and care instructions reviewed.  Pt was provided clear written and oral instruction related to donning, doffing, use, maintenance, safety and potential hazards; verbalizes understanding and satisfaction with fit, comfort, and function.  F/U prn.  Nj Mcgee CPO, LPO  "

## 2021-06-12 NOTE — PROGRESS NOTES
[facility]   Spine Progress Note - Lumbar    Post-operative Day: 2 Days Post-Op    LEFT LUMBAR 3-4 AND RIGHT LUMBAR 4-5 TRANSFORAMINAL LUMBAR INTERBODY FUSION WITH LAMINECTOMY    Subjective:    Pain: mild - moderate  Pain location: surgical site and abdominal pain/pressure  Chest pain, SOB:  No  Nausea/vomiting:  no nausea and no vomiting  Neuro:  No numbness, tingling, or weakness reported in bilateral LE      Objective:    Vital signs in last 24 hours  Temp:  [98.3  F (36.8  C)-99  F (37.2  C)] 98.7  F (37.1  C)  Heart Rate:  [71-82] 71  Resp:  [17-18] 18  BP: (104-130)/(68-83) 104/76  Wt Readings from Last 1 Encounters:   10/16/20 (!) 244 lb (110.7 kg)     Temp Readings from Last 1 Encounters:   10/16/20 98.7  F (37.1  C) (Oral)     BP Readings from Last 1 Encounters:   10/16/20 104/76     Pulse Readings from Last 1 Encounters:   10/16/20 71       Wound status: incisions are clean dry and intact. Yes   Circulation: Dorsalis pedis pulse 2+ bilaterally  Motor:  Intact strength for hip flexors and adductors, quads, AT, EHL, and gastrocs bilateral LE  Sensation: Intact sensation bilateral LE L2-S1, no LE swelling  Calf tenderness:  bilateral  no tenderness  Abdomen mildly tender and full.       Pertinent Labs     Lab Results: personally reviewed.   No results found for: WBC, HGB, HCT, MCV, PLT            Plan: Anticoagulation protocol:    Mechanical and/or ambulation              Pain medications:  oxycodone or tylenol            Weight bearing status:  WBAT            Disposition: Home in 1-2 days pending pain control, bowels and therapy goals.             Continue cares and rehabilitation.  Lumbar corset when out of bed.            Discontinue drain per parameters            Continue bladder protocol.            MM added for aggressive bowel treatment            Will consult hospitalist for postop comorbidities and possible postop ileus.  Appreciate their assistance with management of medical issues.              Corset on when OOB.      Report completed by:  Ryan Mayberry  Date: 10/16/2020  Time: 3:19 PM

## 2021-06-16 PROBLEM — M48.061 SPINAL STENOSIS OF LUMBAR REGION AT MULTIPLE LEVELS: Status: ACTIVE | Noted: 2020-10-14

## 2021-06-21 NOTE — PLAN OF CARE
"Plan of Care by Aron Goss RN at 10/14/2020 11:20 PM     Author: Aron Goss RN Service: -- Author Type: Registered Nurse    Filed: 10/14/2020 11:25 PM Date of Service: 10/14/2020 11:20 PM Status: Signed    : Aron Goss RN (Registered Nurse)         Care Plan Progress Note:    Name: Jamel Noel  :   1955  MRN:   [id number]  Patient arrived in the  unit from  PACU,at 1800 I  stable  status  with  soft  B/P asymptomatic  VSS   surgical  pain  medicated as per  orders denies numbness  and  tingling  in  bed corset  arrived  late  patient  said  \"  I am  tired will  get OOB  in the  morning   no  nausea  and  vomiting  on regular  diet  complained of  sore throat   prn  lozenge   x 1  given  prn  oxycodone x 1  prn  baclofen incision  site  [facility]  dressing  intact  staff  will  continue with  current plan of care.          Problem: Blood pressure is elevated above 140 over 90 mmHg  Goal: Blood pressure (BP) is within acceptable limits  Outcome: Progressing     Problem: Pain  Goal: Patient's pain/discomfort is manageable  Outcome: Progressing     Problem: Safety  Goal: Patient will be injury free during hospitalization  Outcome: Progressing     Problem: Daily Care  Goal: Daily care needs are met  Outcome: Progressing     Problem: Psychosocial Needs  Goal: Demonstrates ability to cope with hospitalization/illness  Outcome: Progressing      10/14/2020  11:20 PM    Aron Goss RN               "

## 2021-08-26 ENCOUNTER — LAB REQUISITION (OUTPATIENT)
Dept: LAB | Facility: CLINIC | Age: 66
End: 2021-08-26
Payer: COMMERCIAL

## 2021-08-26 DIAGNOSIS — Z12.5 ENCOUNTER FOR SCREENING FOR MALIGNANT NEOPLASM OF PROSTATE: ICD-10-CM

## 2021-08-26 DIAGNOSIS — E78.2 MIXED HYPERLIPIDEMIA: ICD-10-CM

## 2021-08-26 DIAGNOSIS — I10 ESSENTIAL (PRIMARY) HYPERTENSION: ICD-10-CM

## 2021-08-26 LAB
ANION GAP SERPL CALCULATED.3IONS-SCNC: 9 MMOL/L (ref 5–18)
BUN SERPL-MCNC: 15 MG/DL (ref 8–22)
CALCIUM SERPL-MCNC: 9.5 MG/DL (ref 8.5–10.5)
CHLORIDE BLD-SCNC: 105 MMOL/L (ref 98–107)
CHOLEST SERPL-MCNC: 117 MG/DL
CO2 SERPL-SCNC: 26 MMOL/L (ref 22–31)
CREAT SERPL-MCNC: 0.99 MG/DL (ref 0.7–1.3)
GFR SERPL CREATININE-BSD FRML MDRD: 79 ML/MIN/1.73M2
GLUCOSE BLD-MCNC: 107 MG/DL (ref 70–125)
HDLC SERPL-MCNC: 30 MG/DL
LDLC SERPL CALC-MCNC: 47 MG/DL
POTASSIUM BLD-SCNC: 4.1 MMOL/L (ref 3.5–5)
PSA SERPL-MCNC: 1.6 UG/L (ref 0–4.5)
SODIUM SERPL-SCNC: 140 MMOL/L (ref 136–145)
TRIGL SERPL-MCNC: 198 MG/DL

## 2021-08-26 PROCEDURE — 82310 ASSAY OF CALCIUM: CPT | Performed by: FAMILY MEDICINE

## 2021-08-26 PROCEDURE — G0103 PSA SCREENING: HCPCS | Mod: ORL | Performed by: FAMILY MEDICINE

## 2021-08-26 PROCEDURE — 80061 LIPID PANEL: CPT | Mod: ORL | Performed by: FAMILY MEDICINE

## 2022-04-13 ENCOUNTER — LAB REQUISITION (OUTPATIENT)
Dept: LAB | Facility: CLINIC | Age: 67
End: 2022-04-13
Payer: COMMERCIAL

## 2022-04-13 DIAGNOSIS — R53.83 OTHER FATIGUE: ICD-10-CM

## 2022-04-13 LAB
ANION GAP SERPL CALCULATED.3IONS-SCNC: 14 MMOL/L (ref 5–18)
BUN SERPL-MCNC: 76 MG/DL (ref 8–22)
CALCIUM SERPL-MCNC: 9 MG/DL (ref 8.5–10.5)
CHLORIDE BLD-SCNC: 106 MMOL/L (ref 98–107)
CO2 SERPL-SCNC: 21 MMOL/L (ref 22–31)
CREAT SERPL-MCNC: 8.27 MG/DL (ref 0.7–1.3)
GFR SERPL CREATININE-BSD FRML MDRD: 7 ML/MIN/1.73M2
GLUCOSE BLD-MCNC: 111 MG/DL (ref 70–125)
POTASSIUM BLD-SCNC: 6.6 MMOL/L (ref 3.5–5)
SODIUM SERPL-SCNC: 141 MMOL/L (ref 136–145)
TSH SERPL DL<=0.005 MIU/L-ACNC: 3.17 UIU/ML (ref 0.3–5)

## 2022-04-13 PROCEDURE — U0003 INFECTIOUS AGENT DETECTION BY NUCLEIC ACID (DNA OR RNA); SEVERE ACUTE RESPIRATORY SYNDROME CORONAVIRUS 2 (SARS-COV-2) (CORONAVIRUS DISEASE [COVID-19]), AMPLIFIED PROBE TECHNIQUE, MAKING USE OF HIGH THROUGHPUT TECHNOLOGIES AS DESCRIBED BY CMS-2020-01-R: HCPCS | Mod: ORL | Performed by: FAMILY MEDICINE

## 2022-04-13 PROCEDURE — 80048 BASIC METABOLIC PNL TOTAL CA: CPT | Mod: ORL | Performed by: FAMILY MEDICINE

## 2022-04-13 PROCEDURE — 84443 ASSAY THYROID STIM HORMONE: CPT | Mod: ORL | Performed by: FAMILY MEDICINE

## 2022-04-14 LAB — SARS-COV-2 RNA RESP QL NAA+PROBE: NEGATIVE

## 2022-04-21 ENCOUNTER — LAB REQUISITION (OUTPATIENT)
Dept: LAB | Facility: CLINIC | Age: 67
End: 2022-04-21
Payer: COMMERCIAL

## 2022-04-21 DIAGNOSIS — N28.9 DISORDER OF KIDNEY AND URETER, UNSPECIFIED: ICD-10-CM

## 2022-04-21 LAB
ALBUMIN SERPL-MCNC: 3.9 G/DL (ref 3.5–5)
ANION GAP SERPL CALCULATED.3IONS-SCNC: 13 MMOL/L (ref 5–18)
BUN SERPL-MCNC: 32 MG/DL (ref 8–22)
CALCIUM SERPL-MCNC: 9.2 MG/DL (ref 8.5–10.5)
CHLORIDE BLD-SCNC: 102 MMOL/L (ref 98–107)
CO2 SERPL-SCNC: 26 MMOL/L (ref 22–31)
CREAT SERPL-MCNC: 1.54 MG/DL (ref 0.7–1.3)
GFR SERPL CREATININE-BSD FRML MDRD: 49 ML/MIN/1.73M2
GLUCOSE BLD-MCNC: 90 MG/DL (ref 70–125)
PHOSPHATE SERPL-MCNC: 2.5 MG/DL (ref 2.5–4.5)
POTASSIUM BLD-SCNC: 4 MMOL/L (ref 3.5–5)
SODIUM SERPL-SCNC: 141 MMOL/L (ref 136–145)

## 2022-04-21 PROCEDURE — 80069 RENAL FUNCTION PANEL: CPT | Mod: ORL | Performed by: FAMILY MEDICINE

## 2022-05-05 ENCOUNTER — LAB REQUISITION (OUTPATIENT)
Dept: LAB | Facility: CLINIC | Age: 67
End: 2022-05-05
Payer: COMMERCIAL

## 2022-05-05 DIAGNOSIS — Z01.818 ENCOUNTER FOR OTHER PREPROCEDURAL EXAMINATION: ICD-10-CM

## 2022-05-05 DIAGNOSIS — N13.9 OBSTRUCTIVE AND REFLUX UROPATHY, UNSPECIFIED: ICD-10-CM

## 2022-05-05 LAB
ANION GAP SERPL CALCULATED.3IONS-SCNC: 12 MMOL/L (ref 5–18)
BUN SERPL-MCNC: 17 MG/DL (ref 8–22)
CALCIUM SERPL-MCNC: 9.6 MG/DL (ref 8.5–10.5)
CHLORIDE BLD-SCNC: 102 MMOL/L (ref 98–107)
CO2 SERPL-SCNC: 26 MMOL/L (ref 22–31)
CREAT SERPL-MCNC: 1.03 MG/DL (ref 0.7–1.3)
GFR SERPL CREATININE-BSD FRML MDRD: 80 ML/MIN/1.73M2
GLUCOSE BLD-MCNC: 91 MG/DL (ref 70–125)
POTASSIUM BLD-SCNC: 4.2 MMOL/L (ref 3.5–5)
SARS-COV-2 RNA RESP QL NAA+PROBE: NEGATIVE
SODIUM SERPL-SCNC: 140 MMOL/L (ref 136–145)

## 2022-05-05 PROCEDURE — U0005 INFEC AGEN DETEC AMPLI PROBE: HCPCS | Mod: ORL | Performed by: FAMILY MEDICINE

## 2022-05-05 PROCEDURE — 80048 BASIC METABOLIC PNL TOTAL CA: CPT | Mod: ORL | Performed by: FAMILY MEDICINE

## 2022-08-05 ENCOUNTER — LAB REQUISITION (OUTPATIENT)
Dept: LAB | Facility: CLINIC | Age: 67
End: 2022-08-05
Payer: COMMERCIAL

## 2022-08-05 ENCOUNTER — TRANSFERRED RECORDS (OUTPATIENT)
Dept: HEALTH INFORMATION MANAGEMENT | Facility: CLINIC | Age: 67
End: 2022-08-05

## 2022-08-05 ENCOUNTER — TRANSCRIBE ORDERS (OUTPATIENT)
Dept: OTHER | Age: 67
End: 2022-08-05

## 2022-08-05 ENCOUNTER — MEDICAL CORRESPONDENCE (OUTPATIENT)
Dept: HEALTH INFORMATION MANAGEMENT | Facility: CLINIC | Age: 67
End: 2022-08-05

## 2022-08-05 DIAGNOSIS — R10.32 LLQ ABDOMINAL PAIN: Primary | ICD-10-CM

## 2022-08-05 DIAGNOSIS — E78.2 MIXED HYPERLIPIDEMIA: ICD-10-CM

## 2022-08-05 DIAGNOSIS — Z12.5 ENCOUNTER FOR SCREENING FOR MALIGNANT NEOPLASM OF PROSTATE: ICD-10-CM

## 2022-08-05 DIAGNOSIS — R10.32 LEFT LOWER QUADRANT PAIN: ICD-10-CM

## 2022-08-05 LAB
ALBUMIN SERPL BCG-MCNC: 4.7 G/DL (ref 3.5–5.2)
ALP SERPL-CCNC: 62 U/L (ref 40–129)
ALT SERPL W P-5'-P-CCNC: 21 U/L (ref 10–50)
ANION GAP SERPL CALCULATED.3IONS-SCNC: 9 MMOL/L (ref 7–15)
AST SERPL W P-5'-P-CCNC: 23 U/L (ref 10–50)
BILIRUB SERPL-MCNC: 0.5 MG/DL
BUN SERPL-MCNC: 22.2 MG/DL (ref 8–23)
CALCIUM SERPL-MCNC: 9.8 MG/DL (ref 8.8–10.2)
CHLORIDE SERPL-SCNC: 101 MMOL/L (ref 98–107)
CHOLEST SERPL-MCNC: 124 MG/DL
CREAT SERPL-MCNC: 1 MG/DL (ref 0.67–1.17)
DEPRECATED HCO3 PLAS-SCNC: 29 MMOL/L (ref 22–29)
GFR SERPL CREATININE-BSD FRML MDRD: 82 ML/MIN/1.73M2
GLUCOSE SERPL-MCNC: 107 MG/DL (ref 70–99)
HDLC SERPL-MCNC: 31 MG/DL
LDLC SERPL CALC-MCNC: 57 MG/DL
LIPASE SERPL-CCNC: 25 U/L (ref 13–60)
NONHDLC SERPL-MCNC: 93 MG/DL
POTASSIUM SERPL-SCNC: 4.3 MMOL/L (ref 3.4–5.3)
PROT SERPL-MCNC: 7 G/DL (ref 6.4–8.3)
PSA SERPL-MCNC: 2.5 NG/ML (ref 0–4.5)
SODIUM SERPL-SCNC: 139 MMOL/L (ref 136–145)
TRIGL SERPL-MCNC: 178 MG/DL

## 2022-08-05 PROCEDURE — 83690 ASSAY OF LIPASE: CPT | Mod: ORL | Performed by: FAMILY MEDICINE

## 2022-08-05 PROCEDURE — 80053 COMPREHEN METABOLIC PANEL: CPT | Mod: ORL | Performed by: FAMILY MEDICINE

## 2022-08-05 PROCEDURE — G0103 PSA SCREENING: HCPCS | Mod: ORL | Performed by: FAMILY MEDICINE

## 2022-08-05 PROCEDURE — 80061 LIPID PANEL: CPT | Mod: ORL | Performed by: FAMILY MEDICINE

## 2022-08-10 ENCOUNTER — TRANSFERRED RECORDS (OUTPATIENT)
Dept: HEALTH INFORMATION MANAGEMENT | Facility: CLINIC | Age: 67
End: 2022-08-10

## 2022-08-19 ENCOUNTER — OFFICE VISIT (OUTPATIENT)
Dept: SURGERY | Facility: CLINIC | Age: 67
End: 2022-08-19
Attending: FAMILY MEDICINE
Payer: COMMERCIAL

## 2022-08-19 VITALS — BODY MASS INDEX: 35.72 KG/M2 | WEIGHT: 249.5 LBS | HEIGHT: 70 IN

## 2022-08-19 DIAGNOSIS — R10.32 LLQ ABDOMINAL PAIN: ICD-10-CM

## 2022-08-19 DIAGNOSIS — K40.90 LEFT INGUINAL HERNIA: Primary | ICD-10-CM

## 2022-08-19 DIAGNOSIS — K42.0 UMBILICAL HERNIA WITH OBSTRUCTION: ICD-10-CM

## 2022-08-19 PROCEDURE — 99203 OFFICE O/P NEW LOW 30 MIN: CPT | Performed by: SPECIALIST

## 2022-08-19 RX ORDER — ATENOLOL 25 MG/1
TABLET ORAL
COMMUNITY
Start: 2022-07-19 | End: 2024-01-31 | Stop reason: DRUGHIGH

## 2022-08-19 RX ORDER — ACETAMINOPHEN 325 MG/1
975 TABLET ORAL ONCE
Status: CANCELLED | OUTPATIENT
Start: 2022-08-19 | End: 2022-08-19

## 2022-08-19 RX ORDER — LOSARTAN POTASSIUM 100 MG/1
TABLET ORAL
COMMUNITY
Start: 2022-07-19

## 2022-08-29 NOTE — PROGRESS NOTES
"      HPI:  This is a 67 year old male here today with concerns of pain and bulging in his left groin area. He has noted this for the past a a few  month. The symptoms have progressed and increased over this time. He comes in for evaluation secondary to the hernia causing enough issues to bother them with daily activities or chores.    Allergies:Banana, Nuts - unspecified [nuts], and Other environmental allergy    Past Medical History:   Diagnosis Date     Chronic sinusitis      GERD (gastroesophageal reflux disease)      Hyperlipemia      Hypertension      Lumbar spondylolysis      Peripheral neuropathy        Past Surgical History:   Procedure Laterality Date     COLONOSCOPY       OTHER SURGICAL HISTORY      vastectomy     OTHER SURGICAL HISTORY Bilateral 2007    carpal tunnel repair       CURRENT MEDS:      History reviewed. No pertinent family history.     reports that he has never smoked. He has never used smokeless tobacco. He reports previous alcohol use. He reports previous drug use.    Review of Systems - Negative except left groin bulge and pain. Otherwise twelve system of review is negative.      Vitals:    08/19/22 1304   Weight: 113.2 kg (249 lb 8 oz)   Height: 1.765 m (5' 9.5\")       Body mass index is 36.32 kg/m .    EXAM:  General: NAD   HEENT: normocephalic, PERRLA and EOMS intact  Mounth: Mucus membranes moist  Neck: Supple  Chest: Clear to auscultation bilaterally  CV: RRR  ABD: Soft nontender and nondistended, left inguinal hernia and umbilical hernia, reducible  EXT: Warm, pulses intact,   Neuro: Alert and oriented x3  Back: no CVA tenderness    Assessment/Plan: Pt with a left inguinal hernia and umbilical hernia. I discussed this at length with He.  I went over conservative management as well as surgical treatment of this.. I would plan on doing this via anlaparoscopic  approach with possible use of mesh. I went over the small risks of surgery including but not limited to bleeding and " infection, anesthesia, recurrence rates and nerve injury. I discussed the expected recovery time as well. Will get this scheduled. He will contact us to have this scheduled.      Cooper Armijo MD ,MD Cooper Armijo MD  General Surgery [phone]  Vascular Surgery [phone]    0

## 2022-09-09 ENCOUNTER — LAB REQUISITION (OUTPATIENT)
Dept: LAB | Facility: CLINIC | Age: 67
End: 2022-09-09
Payer: COMMERCIAL

## 2022-09-09 DIAGNOSIS — I10 ESSENTIAL (PRIMARY) HYPERTENSION: ICD-10-CM

## 2022-09-09 PROCEDURE — 80048 BASIC METABOLIC PNL TOTAL CA: CPT | Mod: ORL | Performed by: FAMILY MEDICINE

## 2022-09-09 PROCEDURE — 83735 ASSAY OF MAGNESIUM: CPT | Mod: ORL | Performed by: FAMILY MEDICINE

## 2022-09-10 LAB
ANION GAP SERPL CALCULATED.3IONS-SCNC: 8 MMOL/L (ref 7–15)
BUN SERPL-MCNC: 17.2 MG/DL (ref 8–23)
CALCIUM SERPL-MCNC: 9.4 MG/DL (ref 8.8–10.2)
CHLORIDE SERPL-SCNC: 103 MMOL/L (ref 98–107)
CREAT SERPL-MCNC: 0.92 MG/DL (ref 0.67–1.17)
DEPRECATED HCO3 PLAS-SCNC: 31 MMOL/L (ref 22–29)
GFR SERPL CREATININE-BSD FRML MDRD: >90 ML/MIN/1.73M2
GLUCOSE SERPL-MCNC: 105 MG/DL (ref 70–99)
MAGNESIUM SERPL-MCNC: 1.8 MG/DL (ref 1.7–2.3)
POTASSIUM SERPL-SCNC: 4 MMOL/L (ref 3.4–5.3)
SODIUM SERPL-SCNC: 142 MMOL/L (ref 136–145)

## 2022-09-14 RX ORDER — SILVER SULFADIAZINE 10 MG/G
CREAM TOPICAL
COMMUNITY
End: 2023-02-28

## 2022-09-14 RX ORDER — LORATADINE 10 MG/1
1 TABLET ORAL
COMMUNITY

## 2022-09-14 RX ORDER — DOCUSATE SODIUM 100 MG/1
1 CAPSULE, LIQUID FILLED ORAL
COMMUNITY

## 2022-09-15 ENCOUNTER — ANESTHESIA EVENT (OUTPATIENT)
Dept: SURGERY | Facility: AMBULATORY SURGERY CENTER | Age: 67
End: 2022-09-15
Payer: COMMERCIAL

## 2022-09-16 ENCOUNTER — ANESTHESIA (OUTPATIENT)
Dept: SURGERY | Facility: AMBULATORY SURGERY CENTER | Age: 67
End: 2022-09-16
Payer: COMMERCIAL

## 2022-09-16 ENCOUNTER — HOSPITAL ENCOUNTER (OUTPATIENT)
Facility: AMBULATORY SURGERY CENTER | Age: 67
Discharge: HOME | End: 2022-09-16
Attending: SPECIALIST
Payer: COMMERCIAL

## 2022-09-16 VITALS
SYSTOLIC BLOOD PRESSURE: 128 MMHG | HEART RATE: 86 BPM | RESPIRATION RATE: 16 BRPM | TEMPERATURE: 96.2 F | OXYGEN SATURATION: 93 % | DIASTOLIC BLOOD PRESSURE: 81 MMHG

## 2022-09-16 DIAGNOSIS — K40.90 LEFT INGUINAL HERNIA: ICD-10-CM

## 2022-09-16 DIAGNOSIS — K42.0 UMBILICAL HERNIA WITH OBSTRUCTION: Primary | ICD-10-CM

## 2022-09-16 DIAGNOSIS — K42.0 UMBILICAL HERNIA WITH OBSTRUCTION: ICD-10-CM

## 2022-09-16 PROCEDURE — 49585 PR REPAIR UMBILICAL HERN,5+Y/O,REDUC: CPT | Performed by: SPECIALIST

## 2022-09-16 PROCEDURE — 49650 LAP ING HERNIA REPAIR INIT: CPT | Mod: LT | Performed by: SPECIALIST

## 2022-09-16 DEVICE — PATCH, HERNIA, SMALL, CIRCLE WITH STRAP, VENTRALEX ST, 1.7IN (4.3CM) DIAMETER 5950007: Type: IMPLANTABLE DEVICE | Site: UMBILICAL | Status: FUNCTIONAL

## 2022-09-16 DEVICE — LAP PROGRIP 15X10CM LPG1510X2: Type: IMPLANTABLE DEVICE | Site: ABDOMEN | Status: FUNCTIONAL

## 2022-09-16 RX ORDER — CEFAZOLIN SODIUM 2 G/100ML
2 INJECTION, SOLUTION INTRAVENOUS SEE ADMIN INSTRUCTIONS
Status: DISCONTINUED | OUTPATIENT
Start: 2022-09-16 | End: 2022-09-17 | Stop reason: HOSPADM

## 2022-09-16 RX ORDER — ONDANSETRON 2 MG/ML
INJECTION INTRAMUSCULAR; INTRAVENOUS PRN
Status: DISCONTINUED | OUTPATIENT
Start: 2022-09-16 | End: 2022-09-16

## 2022-09-16 RX ORDER — FENTANYL CITRATE 50 UG/ML
INJECTION, SOLUTION INTRAMUSCULAR; INTRAVENOUS PRN
Status: DISCONTINUED | OUTPATIENT
Start: 2022-09-16 | End: 2022-09-16

## 2022-09-16 RX ORDER — MAGNESIUM SULFATE HEPTAHYDRATE 40 MG/ML
4 INJECTION, SOLUTION INTRAVENOUS ONCE
Status: COMPLETED | OUTPATIENT
Start: 2022-09-16 | End: 2022-09-16

## 2022-09-16 RX ORDER — LIDOCAINE HYDROCHLORIDE 10 MG/ML
INJECTION, SOLUTION INFILTRATION; PERINEURAL PRN
Status: DISCONTINUED | OUTPATIENT
Start: 2022-09-16 | End: 2022-09-16

## 2022-09-16 RX ORDER — ONDANSETRON 2 MG/ML
4 INJECTION INTRAMUSCULAR; INTRAVENOUS EVERY 30 MIN PRN
Status: DISCONTINUED | OUTPATIENT
Start: 2022-09-16 | End: 2022-09-17 | Stop reason: HOSPADM

## 2022-09-16 RX ORDER — OXYCODONE HYDROCHLORIDE 5 MG/1
5 TABLET ORAL EVERY 4 HOURS PRN
Status: DISCONTINUED | OUTPATIENT
Start: 2022-09-16 | End: 2022-09-17 | Stop reason: HOSPADM

## 2022-09-16 RX ORDER — BUPIVACAINE HYDROCHLORIDE 2.5 MG/ML
INJECTION, SOLUTION EPIDURAL; INFILTRATION; INTRACAUDAL PRN
Status: DISCONTINUED | OUTPATIENT
Start: 2022-09-16 | End: 2022-09-16 | Stop reason: HOSPADM

## 2022-09-16 RX ORDER — CEFAZOLIN SODIUM 2 G/100ML
2 INJECTION, SOLUTION INTRAVENOUS
Status: COMPLETED | OUTPATIENT
Start: 2022-09-16 | End: 2022-09-16

## 2022-09-16 RX ORDER — PROPOFOL 10 MG/ML
INJECTION, EMULSION INTRAVENOUS PRN
Status: DISCONTINUED | OUTPATIENT
Start: 2022-09-16 | End: 2022-09-16

## 2022-09-16 RX ORDER — LIDOCAINE 40 MG/G
CREAM TOPICAL
Status: DISCONTINUED | OUTPATIENT
Start: 2022-09-16 | End: 2022-09-17 | Stop reason: HOSPADM

## 2022-09-16 RX ORDER — GLYCOPYRROLATE 0.2 MG/ML
INJECTION, SOLUTION INTRAMUSCULAR; INTRAVENOUS PRN
Status: DISCONTINUED | OUTPATIENT
Start: 2022-09-16 | End: 2022-09-16

## 2022-09-16 RX ORDER — FENTANYL CITRATE 0.05 MG/ML
25 INJECTION, SOLUTION INTRAMUSCULAR; INTRAVENOUS
Status: DISCONTINUED | OUTPATIENT
Start: 2022-09-16 | End: 2022-09-17 | Stop reason: HOSPADM

## 2022-09-16 RX ORDER — ACETAMINOPHEN 325 MG/1
975 TABLET ORAL ONCE
Status: DISCONTINUED | OUTPATIENT
Start: 2022-09-16 | End: 2022-09-17 | Stop reason: HOSPADM

## 2022-09-16 RX ORDER — KETOROLAC TROMETHAMINE 30 MG/ML
INJECTION, SOLUTION INTRAMUSCULAR; INTRAVENOUS PRN
Status: DISCONTINUED | OUTPATIENT
Start: 2022-09-16 | End: 2022-09-16

## 2022-09-16 RX ORDER — HYDROMORPHONE HCL IN WATER/PF 6 MG/30 ML
0.2 PATIENT CONTROLLED ANALGESIA SYRINGE INTRAVENOUS EVERY 5 MIN PRN
Status: DISCONTINUED | OUTPATIENT
Start: 2022-09-16 | End: 2022-09-17 | Stop reason: HOSPADM

## 2022-09-16 RX ORDER — HYDROCODONE BITARTRATE AND ACETAMINOPHEN 5; 325 MG/1; MG/1
1-2 TABLET ORAL EVERY 6 HOURS PRN
Qty: 18 TABLET | Refills: 0 | Status: SHIPPED | OUTPATIENT
Start: 2022-09-16 | End: 2022-09-19

## 2022-09-16 RX ORDER — DEXAMETHASONE SODIUM PHOSPHATE 4 MG/ML
INJECTION, SOLUTION INTRA-ARTICULAR; INTRALESIONAL; INTRAMUSCULAR; INTRAVENOUS; SOFT TISSUE PRN
Status: DISCONTINUED | OUTPATIENT
Start: 2022-09-16 | End: 2022-09-16

## 2022-09-16 RX ORDER — ACETAMINOPHEN 325 MG/1
975 TABLET ORAL ONCE
Status: COMPLETED | OUTPATIENT
Start: 2022-09-16 | End: 2022-09-16

## 2022-09-16 RX ORDER — SODIUM CHLORIDE, SODIUM LACTATE, POTASSIUM CHLORIDE, CALCIUM CHLORIDE 600; 310; 30; 20 MG/100ML; MG/100ML; MG/100ML; MG/100ML
INJECTION, SOLUTION INTRAVENOUS CONTINUOUS
Status: DISCONTINUED | OUTPATIENT
Start: 2022-09-16 | End: 2022-09-17 | Stop reason: HOSPADM

## 2022-09-16 RX ORDER — ONDANSETRON 4 MG/1
4 TABLET, ORALLY DISINTEGRATING ORAL EVERY 30 MIN PRN
Status: DISCONTINUED | OUTPATIENT
Start: 2022-09-16 | End: 2022-09-17 | Stop reason: HOSPADM

## 2022-09-16 RX ORDER — FENTANYL CITRATE 0.05 MG/ML
25 INJECTION, SOLUTION INTRAMUSCULAR; INTRAVENOUS EVERY 5 MIN PRN
Status: DISCONTINUED | OUTPATIENT
Start: 2022-09-16 | End: 2022-09-17 | Stop reason: HOSPADM

## 2022-09-16 RX ORDER — KETAMINE HYDROCHLORIDE 10 MG/ML
INJECTION INTRAMUSCULAR; INTRAVENOUS PRN
Status: DISCONTINUED | OUTPATIENT
Start: 2022-09-16 | End: 2022-09-16

## 2022-09-16 RX ORDER — MEPERIDINE HYDROCHLORIDE 25 MG/ML
12.5 INJECTION INTRAMUSCULAR; INTRAVENOUS; SUBCUTANEOUS
Status: DISCONTINUED | OUTPATIENT
Start: 2022-09-16 | End: 2022-09-17 | Stop reason: HOSPADM

## 2022-09-16 RX ORDER — EPHEDRINE SULFATE 50 MG/ML
INJECTION, SOLUTION INTRAMUSCULAR; INTRAVENOUS; SUBCUTANEOUS PRN
Status: DISCONTINUED | OUTPATIENT
Start: 2022-09-16 | End: 2022-09-16

## 2022-09-16 RX ORDER — NEOSTIGMINE METHYLSULFATE 1 MG/ML
VIAL (ML) INJECTION PRN
Status: DISCONTINUED | OUTPATIENT
Start: 2022-09-16 | End: 2022-09-16

## 2022-09-16 RX ADMIN — CEFAZOLIN SODIUM 2 G: 2 INJECTION, SOLUTION INTRAVENOUS at 08:11

## 2022-09-16 RX ADMIN — GLYCOPYRROLATE 0.2 MG: 0.2 INJECTION, SOLUTION INTRAMUSCULAR; INTRAVENOUS at 08:21

## 2022-09-16 RX ADMIN — EPHEDRINE SULFATE 10 MG: 50 INJECTION, SOLUTION INTRAMUSCULAR; INTRAVENOUS; SUBCUTANEOUS at 08:29

## 2022-09-16 RX ADMIN — FENTANYL CITRATE 100 MCG: 50 INJECTION, SOLUTION INTRAMUSCULAR; INTRAVENOUS at 08:12

## 2022-09-16 RX ADMIN — SODIUM CHLORIDE, SODIUM LACTATE, POTASSIUM CHLORIDE, CALCIUM CHLORIDE: 600; 310; 30; 20 INJECTION, SOLUTION INTRAVENOUS at 07:21

## 2022-09-16 RX ADMIN — GLYCOPYRROLATE 0.6 MG: 0.2 INJECTION, SOLUTION INTRAMUSCULAR; INTRAVENOUS at 09:00

## 2022-09-16 RX ADMIN — PROPOFOL 50 MG: 10 INJECTION, EMULSION INTRAVENOUS at 08:14

## 2022-09-16 RX ADMIN — Medication 50 MG: at 08:12

## 2022-09-16 RX ADMIN — EPHEDRINE SULFATE 10 MG: 50 INJECTION, SOLUTION INTRAMUSCULAR; INTRAVENOUS; SUBCUTANEOUS at 08:58

## 2022-09-16 RX ADMIN — OXYCODONE HYDROCHLORIDE 5 MG: 5 TABLET ORAL at 10:02

## 2022-09-16 RX ADMIN — KETOROLAC TROMETHAMINE 15 MG: 30 INJECTION, SOLUTION INTRAMUSCULAR; INTRAVENOUS at 08:59

## 2022-09-16 RX ADMIN — Medication 5 MG: at 09:00

## 2022-09-16 RX ADMIN — EPHEDRINE SULFATE 10 MG: 50 INJECTION, SOLUTION INTRAMUSCULAR; INTRAVENOUS; SUBCUTANEOUS at 08:21

## 2022-09-16 RX ADMIN — LIDOCAINE HYDROCHLORIDE 3 ML: 10 INJECTION, SOLUTION INFILTRATION; PERINEURAL at 08:12

## 2022-09-16 RX ADMIN — EPHEDRINE SULFATE 10 MG: 50 INJECTION, SOLUTION INTRAMUSCULAR; INTRAVENOUS; SUBCUTANEOUS at 08:25

## 2022-09-16 RX ADMIN — DEXAMETHASONE SODIUM PHOSPHATE 8 MG: 4 INJECTION, SOLUTION INTRA-ARTICULAR; INTRALESIONAL; INTRAMUSCULAR; INTRAVENOUS; SOFT TISSUE at 08:12

## 2022-09-16 RX ADMIN — ONDANSETRON 4 MG: 2 INJECTION INTRAMUSCULAR; INTRAVENOUS at 08:59

## 2022-09-16 RX ADMIN — PROPOFOL 200 MG: 10 INJECTION, EMULSION INTRAVENOUS at 08:12

## 2022-09-16 RX ADMIN — Medication 100 MCG: at 08:30

## 2022-09-16 RX ADMIN — KETAMINE HYDROCHLORIDE 20 MG: 10 INJECTION INTRAMUSCULAR; INTRAVENOUS at 08:54

## 2022-09-16 RX ADMIN — MAGNESIUM SULFATE HEPTAHYDRATE 4 G: 40 INJECTION, SOLUTION INTRAVENOUS at 07:48

## 2022-09-16 RX ADMIN — ACETAMINOPHEN 1000 MG: 325 TABLET ORAL at 07:20

## 2022-09-16 RX ADMIN — FENTANYL CITRATE 25 MCG: 0.05 INJECTION, SOLUTION INTRAMUSCULAR; INTRAVENOUS at 09:33

## 2022-09-16 NOTE — OP NOTE
Operative Note    Name:  Jamel Noel  PCP:  Yvan Vergara  Procedure Date:  9/16/2022      Procedure(s):  HERNIORRHAPHY, INGUINAL, LAPAROSCOPIC, HERNIORRHAPHY, UMBILICAL, OPEN  HERNIORRHAPHY, UMBILICAL, OPEN    Pre-Procedure Diagnosis:  Left inguinal hernia [K40.90]  Umbilical hernia with obstruction [K42.0]     Post-Procedure Diagnosis:    inguinal hernia left, umbilical hernia    Surgeon(s):  Cooper Armijo MD    Anesthesia Type:  Not documented    Past Medical History:   Diagnosis Date     Chronic sinusitis      GERD (gastroesophageal reflux disease)      Hyperlipemia      Hypertension      Lumbar spondylolysis      Peripheral neuropathy      Renal disease        Patient Active Problem List    Diagnosis Date Noted     Left inguinal hernia 08/19/2022     Priority: Medium     Added automatically from request for surgery [id number]       Umbilical hernia with obstruction 08/19/2022     Priority: Medium     Added automatically from request for surgery [id number]       Spinal stenosis of lumbar region at multiple levels 10/14/2020     Priority: Medium       Findings:  Large indirect inguinal hernia    Operative Report:    Consent was obtained and the patient was taken to the operating room.  Gen. anesthesia was administered by the anesthesia staff.  The briefing and timeout was performed by the OR staff.  The patient was prepped and draped in a sterile manner after a Perdue was placed.  The incision was made opposite the hernia at the umbilicus.  This was taken down through the anterior fascia of the rectus sheath.  A dissecting port was placed to the pubic tubercle under direct visualization.  This was dilated to its full diameter under direct visualization and removed.  An operating scope was placed in the preperitoneal space was insufflated to a pressure of 15 mmHg of CO2.  Two 5 mm trochars were placed on the midline under direct visualization of the scope.  The landmarks of the pelvis were identified  the pubic tubercle, Connor's ligament and the cord and vascular structures.  The hernia sac was reduced internally.  This was done with blunt and sharp dissection.  Pro  mesh was rolled and placed in the preperitoneal space covering the pubic tubercle, Connor's ligament, the cord and vascular structures.  20 mL of Marcaine was injected in the space.  The mesh lied in good position and the CO2 and trochars were removed.  The anterior abdominal wall defect was closed with a 0 Vicryl figure-of-eight suture.  I then at this time point repaired the umbilical hernia the umbilical sac was resected from the surrounding tissue this was resected at the base of the fascia.  I placed a 4.4 cm Ventralex patch to the defect sutured this intermittently with 0 Ethibond suture closing the defect and incorporating the mesh in this repair.  I tacked the umbilicus down with a 3-0 Monocryl suture and closed the deep layers with 3-0 Monocryl all the incisions were closed with 4-0 Monocryl suture in a subcuticular fashion.  Steri-Strips and sterile dressings and 40 mL of Marcaine were injected in the incisions.  15  mg of Toradol were given IV by anesthesia.  The patient was extubated, the Perdue removed and transferred to the PACU in stable condition.  All sponge and needle counts are correct.    Estimated Blood Loss:   25 ml    Specimens:    none       Drains:   none    Complications:    None    Cooper Armijo MD     Date: 9/16/2022  Time: 9:21 AM

## 2022-09-16 NOTE — ANESTHESIA POSTPROCEDURE EVALUATION
Patient: Jamel Noel    Procedure: Procedure(s):  HERNIORRHAPHY, INGUINAL, LAPAROSCOPIC, HERNIORRHAPHY, UMBILICAL, OPEN  HERNIORRHAPHY, UMBILICAL, OPEN       Anesthesia Type:  General    Note:  Disposition: Outpatient   Postop Pain Control: Uneventful            Sign Out: Well controlled pain   PONV: No   Neuro/Psych: Uneventful            Sign Out: Acceptable/Baseline neuro status   Airway/Respiratory: Uneventful            Sign Out: Acceptable/Baseline resp. status   CV/Hemodynamics: Uneventful            Sign Out: Acceptable CV status; No obvious hypovolemia; No obvious fluid overload   Other NRE: NONE   DID A NON-ROUTINE EVENT OCCUR? No           Last vitals:  Vitals Value Taken Time   /71 09/16/22 0950   Temp 96.2  F (35.7  C) 09/16/22 0916   Pulse 62 09/16/22 0948   Resp 16 09/16/22 0916   SpO2 94 % 09/16/22 0948   Vitals shown include unvalidated device data.    Electronically Signed By: Liliana Camacho MD  September 16, 2022  10:18 AM

## 2022-09-16 NOTE — INTERVAL H&P NOTE
"I have reviewed the surgical (or preoperative) H&P that is linked to this encounter, and examined the patient. There are no significant changes.        Cooper Armijo MD  General Surgery [phone]  Vascular Surgery [phone]          Clinical Conditions Present on Arrival:  Clinically Significant Risk Factors Present on Admission                   # Obesity: Estimated body mass index is 36.32 kg/m  as calculated from the following:    Height as of 8/19/22: 1.765 m (5' 9.5\").    Weight as of 8/19/22: 113.2 kg (249 lb 8 oz).       "

## 2022-09-16 NOTE — DISCHARGE INSTRUCTIONS
If you have any questions or concerns regarding your procedure, please contact Dr. Armijo, his office number is [phone].      Take tylenol and ibuprofen every 6 hours as your main form of pain control.     Acetaminophen (Tylenol): Next Dose: 1:30 pm. Take 650-1000 mg every 6 hours for mild to moderate pain.    Ibuprofen (Motrin, Advil): Next Dose: 3 pm. Take 600 mg every 6 hours for mild to moderate pain.    Do not exceed 4,000 mg of acetaminophen during a 24 hour period  or 1000 mg per dose. Keep in mind that acetaminophen can also be found in many over-the-counter cold medications as well as narcotics that may be given for pain.     Narcotics:    Hydrocodone-acetaminophen: Next Dose: 4:00 pm Take 1-2 tab(s) every 6 hours for moderate to severe pain not controlled with tylenol and ibuprofen.    Senna-Docusate (Stool Softener): Next dose: When you get home. Take as instructed on the bottle. This is an over-the-counter medication. Take this while taking narcotic medications to prevent constipation.    Discharge Instructions: After Your Surgery    You ve just had surgery. During surgery, you were given medicine called anesthesia to keep you relaxed and free of pain. After surgery, you may have some pain or nausea. This is common. Here are some tips for feeling better and getting well after surgery.    Going home    Your healthcare provider will show you how to take care of yourself when you go home. He or she will also answer your questions. Have an adult family member or friend drive you home. For the first 24 hours after your surgery:  Don't drive or use heavy equipment.  Don't make important decisions or sign legal papers.  Don't drink alcohol.  Have an adult stay with you for 24 hours. He or she can watch for problems and help keep you safe.  Be sure to go to all follow-up visits with your healthcare provider. And rest after your surgery for as long as your healthcare provider tells you to.    Coping with  pain    If you have pain after surgery, pain medicine will help you feel better. Take it as told, before pain becomes severe. Also, ask your healthcare provider or pharmacist about other ways to control pain. This might be with heat, ice, or relaxation. And follow any other instructions your surgeon or nurse gives you.    Tips for taking pain medicine    To get the best relief possible, remember these points:  Pain medicines can upset your stomach. Taking them with a little food may help.  Most pain relievers taken by mouth need at least 20 to 30 minutes to start to work.  Don't wait till your pain becomes severe before you take your medicine. Try to time your medicine so that you can take it before starting an activity. This might be before you get dressed, go for a walk, or sit down for dinner.  Constipation is a common side effect of pain medicines. It's ok to take medicines such as laxatives or stool softeners to help ease constipation. Also ask if you should skip any foods. Drinking lots of fluids and eating foods such as fruits and vegetables that are high in fiber can also help.  Drinking alcohol and taking pain medicine can cause dizziness and slow your breathing. It can even be deadly. Don't drink alcohol while taking pain medicine.  Pain medicine can make you react more slowly to things. Don't drive or run machinery while taking pain medicine.  Your healthcare provider may tell you to take acetaminophen to help ease your pain. Ask him or her how much you are supposed to take each day. Acetaminophen or other pain relievers may interact with your prescription medicines or other over-the-counter (OTC) medicines. Some prescription medicines have acetaminophen and other ingredients. Using both prescription and OTC acetaminophen for pain can cause you to overdose. Read the labels on your OTC medicines with care. This will help you to clearly know the list of ingredients, how much to take, and any warnings. It  may also help you not take too much acetaminophen. If you have questions or don't understand the information, ask your pharmacist or healthcare provider to explain it to you before you take the OTC medicine.    Managing nausea    Some people have an upset stomach after surgery. This is often because of anesthesia, pain, or pain medicine, or the stress of surgery. These tips will help you handle nausea and eat healthy foods as you get better. If you were on a special food plan before surgery, ask your healthcare provider if you should follow it while you get better. These tips may help:    Don't push yourself to eat. Your body will tell you when to eat and how much.  Start off with clear liquids and soup. They are easier to digest.  Next try semi-solid foods, such as mashed potatoes, applesauce, and gelatin, as you feel ready.  Slowly move to solid foods. Don t eat fatty, rich, or spicy foods at first.  Don't force yourself to have 3 large meals a day. Instead eat smaller amounts more often.  Take pain medicines with a small amount of solid food, such as crackers or toast, to prevent nausea.    When to call your healthcare provider    Call your healthcare provider if:  You still have intolerable pain an hour after taking medicine. The medicine may not be strong enough.  You feel too sleepy, dizzy, or groggy. The medicine may be too strong.  You have side effects such as nausea or vomiting, or skin changes such as rash, itching, or hives. Your healthcare provider may suggest other medicines to control side effects.  Rash, itching, or hives may mean you have an allergic reaction. Report this right away. If you have trouble breathing or facial swelling, call 911 right away.    If you have obstructive sleep apnea    You were given anesthesia medicine during surgery to keep you comfortable and free of pain. After surgery, you may have more apnea spells because of this medicine and other medicines you were given. The  spells may last longer than usual.   At home:  Keep using the continuous positive airway pressure (CPAP) device when you sleep. Unless your healthcare provider tells you not to, use it when you sleep, day or night. CPAP is a common device used to treat obstructive sleep apnea.  Talk with your provider before taking any pain medicine, muscle relaxants, or sedatives. Your provider will tell you about the possible dangers of taking these medicines.

## 2022-09-16 NOTE — PROGRESS NOTES
Pt has incentive spirometer at home and verbalizes understanding of use. Pt instructed to use this a few time per hour while awake for the next few days. Pt agrees with plan.    Tiffanie Cho RN on 9/16/2022 at 10:51 AM

## 2022-09-16 NOTE — PROGRESS NOTES
Pt presented a NEGATIVE Covid test performed 9.15.22 upon admission to [facility].

## 2022-09-16 NOTE — ANESTHESIA PREPROCEDURE EVALUATION
Anesthesia Pre-Procedure Evaluation    Patient: Jamel Noel   MRN: [id number] : 1955        Procedure : Procedure(s):  HERNIORRHAPHY, INGUINAL, LAPAROSCOPIC, HERNIORRHAPHY, UMBILICAL, OPEN  HERNIORRHAPHY, UMBILICAL, OPEN          Past Medical History:   Diagnosis Date     Chronic sinusitis      GERD (gastroesophageal reflux disease)      Hyperlipemia      Hypertension      Lumbar spondylolysis      Peripheral neuropathy      Renal disease       Past Surgical History:   Procedure Laterality Date     COLONOSCOPY       OTHER SURGICAL HISTORY      vastectomy     OTHER SURGICAL HISTORY Bilateral     carpal tunnel repair      Allergies   Allergen Reactions     Banana Itching     Itching throat     Nuts - Unspecified [Nuts] Itching     Itching throat     Other Environmental Allergy Other (See Comments)     Grass Hayfever  Itching throat runny eyes      Social History     Tobacco Use     Smoking status: Never Smoker     Smokeless tobacco: Never Used   Substance Use Topics     Alcohol use: Not Currently     Comment: Alcoholic Drinks/day: no drinking for 5 years      Wt Readings from Last 1 Encounters:   22 113.2 kg (249 lb 8 oz)        Anesthesia Evaluation            ROS/MED HX  ENT/Pulmonary:  - neg pulmonary ROS     Neurologic:  - neg neurologic ROS     Cardiovascular:     (+) hypertension-----    METS/Exercise Tolerance:     Hematologic:  - neg hematologic  ROS     Musculoskeletal:       GI/Hepatic:     (+) GERD,     Renal/Genitourinary:  - neg Renal ROS     Endo:     (+) Obesity,     Psychiatric/Substance Use:       Infectious Disease:       Malignancy:       Other:            Physical Exam    Airway  airway exam normal      Mallampati: II   TM distance: > 3 FB   Neck ROM: full   Mouth opening: > 3 cm    Respiratory Devices and Support         Dental  no notable dental history         Cardiovascular   cardiovascular exam normal          Pulmonary   pulmonary exam normal                OUTSIDE  LABS:  CBC: No results found for: WBC, HGB, HCT, PLT  BMP:   Lab Results   Component Value Date     09/09/2022     08/05/2022    POTASSIUM 4.0 09/09/2022    POTASSIUM 4.3 08/05/2022    CHLORIDE 103 09/09/2022    CHLORIDE 101 08/05/2022    CO2 31 (H) 09/09/2022    CO2 29 08/05/2022    BUN 17.2 09/09/2022    BUN 22.2 08/05/2022    CR 0.92 09/09/2022    CR 1.00 08/05/2022     (H) 09/09/2022     (H) 08/05/2022     COAGS: No results found for: PTT, INR, FIBR  POC: No results found for: BGM, HCG, HCGS  HEPATIC:   Lab Results   Component Value Date    ALBUMIN 4.7 08/05/2022    PROTTOTAL 7.0 08/05/2022    ALT 21 08/05/2022    AST 23 08/05/2022    ALKPHOS 62 08/05/2022    BILITOTAL 0.5 08/05/2022     OTHER:   Lab Results   Component Value Date    YOAN 9.4 09/09/2022    PHOS 2.5 04/21/2022    MAG 1.8 09/09/2022    LIPASE 25 08/05/2022    TSH 3.17 04/13/2022       Anesthesia Plan    ASA Status:  2   NPO Status:  NPO Appropriate    Anesthesia Type: General.     - Airway: ETT   Induction: Intravenous.   Maintenance: Balanced.        Consents    Anesthesia Plan(s) and associated risks, benefits, and realistic alternatives discussed. Questions answered and patient/representative(s) expressed understanding.    - Discussed:     - Discussed with:  Patient         Postoperative Care    Pain management: Multi-modal analgesia.   PONV prophylaxis: Ondansetron (or other 5HT-3)     Comments:                Liliana Camacho MD

## 2022-09-16 NOTE — ANESTHESIA CARE TRANSFER NOTE
Patient: Jamel Noel    Procedure: Procedure(s):  HERNIORRHAPHY, INGUINAL, LAPAROSCOPIC, HERNIORRHAPHY, UMBILICAL, OPEN  HERNIORRHAPHY, UMBILICAL, OPEN       Diagnosis: Left inguinal hernia [K40.90]  Umbilical hernia with obstruction [K42.0]  Diagnosis Additional Information: No value filed.    Anesthesia Type:   General     Note:    Oropharynx: oropharynx clear of all foreign objects  Level of Consciousness: drowsy  Oxygen Supplementation: face mask  Level of Supplemental Oxygen (L/min / FiO2): 7  Independent Airway: airway patency satisfactory and stable  Dentition: dentition unchanged  Vital Signs Stable: post-procedure vital signs reviewed and stable  Report to RN Given: handoff report given  Patient transferred to: PACU  Comments: Patient transferred to PACU by CRNA. Report given to PACU RN, all questions answered. Patient hemodynamically stable. CRNA ensured PACU RN was comfortable taking over care.  Handoff Report: Identifed the Patient, Identified the Reponsible Provider, Reviewed the pertinent medical history, Discussed the surgical course, Reviewed Intra-OP anesthesia mangement and issues during anesthesia, Set expectations for post-procedure period and Allowed opportunity for questions and acknowledgement of understanding      Vitals:  Vitals Value Taken Time   /77 09/16/22 0916   Temp 35.7  C (96.2  F) 09/16/22 0916   Pulse 69 09/16/22 0916   Resp 16 09/16/22 0916   SpO2 95 % 09/16/22 0916       Electronically Signed By: SHELLY Alves CRNA  September 16, 2022  9:18 AM

## 2022-09-16 NOTE — ANESTHESIA PROCEDURE NOTES
Airway       Patient location during procedure: OR       Procedure Start/Stop Times: 9/16/2022 8:15 AM  Staff -        CRNA: Amari Ferrara APRN CRNA       Performed By: CRNA  Consent for Airway        Urgency: elective  Indications and Patient Condition       Indications for airway management: uriel-procedural       Induction type:intravenous       Mask difficulty assessment: 2 - vent by mask + OA or adjuvant +/- NMBA    Final Airway Details       Final airway type: endotracheal airway       Successful airway: ETT - single and Oral  Endotracheal Airway Details        ETT size (mm): 7.5       Cuffed: yes       Successful intubation technique: direct laryngoscopy       DL Blade Type: Lakhani 2       Grade View of Cords: 2       Adjucts: stylet and tooth guard       Position: Right       Measured from: gums/teeth       Secured at (cm): 23       Bite block used: None    Post intubation assessment        Placement verified by: capnometry, equal breath sounds and chest rise        Number of attempts at approach: 1       Number of other approaches attempted: 0       Secured with: silk tape       Ease of procedure: easy       Dentition: Intact and Unchanged    Medication(s) Administered   Medication Administration Time: 9/16/2022 8:15 AM

## 2022-09-16 NOTE — PROGRESS NOTES
Pt ambulated in hallway with O2 saturation levels 89-92. Pt sating 91 at rest at this time. Reports with his previous surgery there were issues with keeping his oxygen levels up. Okay per verbal order read back by Dr. Camacho for patient to discharge with saturation levels above 88 percent.    Tiffanie Cho RN on 9/16/2022 at 10:49 AM

## 2022-09-28 ENCOUNTER — OFFICE VISIT (OUTPATIENT)
Dept: SURGERY | Facility: CLINIC | Age: 67
End: 2022-09-28
Payer: COMMERCIAL

## 2022-09-28 VITALS — DIASTOLIC BLOOD PRESSURE: 68 MMHG | SYSTOLIC BLOOD PRESSURE: 132 MMHG

## 2022-09-28 DIAGNOSIS — L03.316 CELLULITIS OF UMBILICUS: ICD-10-CM

## 2022-09-28 DIAGNOSIS — Z48.89 ENCOUNTER FOR POSTOPERATIVE CARE: Primary | ICD-10-CM

## 2022-09-28 PROCEDURE — 99024 POSTOP FOLLOW-UP VISIT: CPT | Performed by: PHYSICIAN ASSISTANT

## 2022-09-28 RX ORDER — CEPHALEXIN 500 MG/1
500 CAPSULE ORAL 3 TIMES DAILY
Qty: 15 CAPSULE | Refills: 0 | Status: SHIPPED | OUTPATIENT
Start: 2022-09-28 | End: 2022-10-03

## 2022-09-28 RX ORDER — MOMETASONE FUROATE 1 MG/G
CREAM TOPICAL
COMMUNITY
Start: 2022-05-05 | End: 2023-02-28

## 2022-09-28 NOTE — LETTER
9/28/2022         RE: Jamel Noel  [address] [id number]        Dear Colleague,    Thank you for referring your patient, Jamel Noel, to the [facility] [place]. Please see a copy of my visit note below.    HPI: 67-year-old male underwent umbilical hernia and left inguinal hernia repair with Dr. Armijo on 9/16/2022.  He states that he is overall doing very well.  He has no question concerns.  He has been doing well with minimal pain.  He has a little bit of discomfort in his bellybutton and wife did notice a little bit of drainage.  Has been afebrile.  Normal bowel movements.  Eating well.      /68 (BP Location: Right arm, Patient Position: Sitting, Cuff Size: Adult Regular)     EXAM:  GENERAL:Appears well  ABDOMEN:  Soft, +BS  Surgical wounds the umbilical wound he has surrounding edema and erythema and with palpitation normal healing induration palpated and no induration in the dermis.  He does have a little bit of serous fluid just on his Steri-Strips I remove these.  I cannot see any other further drainage.  No fluctuance.  Assessment/Plan: . Doing well after surgery and should follow up as needed.  Patient does have a seroma however he may also have a mild skin infection so we will start him on Keflex 500 mg 3 times daily for 5 days.  We will follow-up if not doing well.    Wilfredo GALICIA              Again, thank you for allowing me to participate in the care of your patient.        Sincerely,        Wilfredo Perez PA-C

## 2022-09-28 NOTE — PROGRESS NOTES
HPI: 67-year-old male underwent umbilical hernia and left inguinal hernia repair with Dr. Armijo on 9/16/2022.  He states that he is overall doing very well.  He has no question concerns.  He has been doing well with minimal pain.  He has a little bit of discomfort in his bellybutton and wife did notice a little bit of drainage.  Has been afebrile.  Normal bowel movements.  Eating well.      /68 (BP Location: Right arm, Patient Position: Sitting, Cuff Size: Adult Regular)     EXAM:  GENERAL:Appears well  ABDOMEN:  Soft, +BS  Surgical wounds the umbilical wound he has surrounding edema and erythema and with palpitation normal healing induration palpated and no induration in the dermis.  He does have a little bit of serous fluid just on his Steri-Strips I remove these.  I cannot see any other further drainage.  No fluctuance.  Assessment/Plan: . Doing well after surgery and should follow up as needed.  Patient does have a seroma however he may also have a mild skin infection so we will start him on Keflex 500 mg 3 times daily for 5 days.  We will follow-up if not doing well.    Wilfredo Perez MPA-C

## 2023-01-06 ENCOUNTER — OFFICE VISIT (OUTPATIENT)
Dept: SURGERY | Facility: CLINIC | Age: 68
End: 2023-01-06
Payer: COMMERCIAL

## 2023-01-06 ENCOUNTER — TELEPHONE (OUTPATIENT)
Dept: SURGERY | Facility: CLINIC | Age: 68
End: 2023-01-06

## 2023-01-06 VITALS — DIASTOLIC BLOOD PRESSURE: 80 MMHG | SYSTOLIC BLOOD PRESSURE: 130 MMHG

## 2023-01-06 DIAGNOSIS — S30.1XXA ABDOMINAL WALL SEROMA, INITIAL ENCOUNTER: Primary | ICD-10-CM

## 2023-01-06 PROCEDURE — 99213 OFFICE O/P EST LOW 20 MIN: CPT | Performed by: SPECIALIST

## 2023-01-06 RX ORDER — CEPHALEXIN 750 MG/1
750 CAPSULE ORAL 2 TIMES DAILY
Qty: 14 CAPSULE | Refills: 1 | Status: SHIPPED | OUTPATIENT
Start: 2023-01-06 | End: 2023-02-28

## 2023-01-06 NOTE — TELEPHONE ENCOUNTER
Had hernia surgery the end of August.  About 10 days ago the incision has started oozing puss  and bleeding.  He did have an infection after surgery that was treated with antibiotics.    Please call and advise.    Thanks!

## 2023-01-06 NOTE — LETTER
1/6/2023         RE: Jamel Noel  [address] [id number]        Dear Colleague,    Thank you for referring your patient, Jamel Noel, to the [facility] [place]. Please see a copy of my visit note below.    [facility] Surgery Follow up    HPI:    67 year old year old male who returns for a follow up.  The patient had a hernia pair back in September this is a laparoscopic repair and also umbilical hernia repair.  He is generally quite well but had some drainage from his umbilical incision here of lately which has been serous output.  Also some groin pain on the left side rehabs repair intermittently.    Allergies:Banana, Nuts - unspecified [nuts], and Other environmental allergy    Past Medical History:   Diagnosis Date     Chronic sinusitis      GERD (gastroesophageal reflux disease)      Hyperlipemia      Hypertension      Lumbar spondylolysis      Peripheral neuropathy      Renal disease        Past Surgical History:   Procedure Laterality Date     COLONOSCOPY       HERNIORRHAPHY UMBILICAL N/A 9/16/2022    Procedure: HERNIORRHAPHY, UMBILICAL, OPEN;  Surgeon: Cooper Armijo MD;  Location: [facility]     LAPAROSCOPIC HERNIORRHAPHY INGUINAL Left 9/16/2022    Procedure: HERNIORRHAPHY, INGUINAL, LAPAROSCOPIC, HERNIORRHAPHY, UMBILICAL, OPEN;  Surgeon: Cooper Armijo MD;  Location: [facility]     OTHER SURGICAL HISTORY      vastectomy     OTHER SURGICAL HISTORY Bilateral 2007    carpal tunnel repair       CURRENT MEDS:  Current Outpatient Medications   Medication     cephALEXin (KEFLEX) 750 MG capsule     acetaminophen (TYLENOL) 500 MG tablet     atenolol (TENORMIN) 25 MG tablet     atorvastatin (LIPITOR) 20 MG tablet     docusate sodium (COLACE) 100 MG capsule     gabapentin (NEURONTIN) 300 MG capsule     loratadine (CLARITIN) 10 MG tablet     losartan (COZAAR) 100 MG tablet     mometasone (ELOCON) 0.1 % external cream      Multiple Vitamins-Minerals (CENTRUM SILVER 50+MEN) TABS     omeprazole (PRILOSEC) 20 MG DR capsule     silver sulfADIAZINE (SILVADENE) 1 % external cream     vitamin D3 (CHOLECALCIFEROL) 125 MCG (5000 UT) tablet     No current facility-administered medications for this visit.       History reviewed. No pertinent family history.     reports that he has never smoked. He has never used smokeless tobacco. He reports that he does not currently use alcohol. He reports that he does not currently use drugs.    Review of Systems:  Negative except drainage from the umbilical incision and pain in his left groin.  No fevers or chills no systemic symptoms    OBJECTIVE:  Vitals:    01/06/23 1431   BP: 130/80     There is no height or weight on file to calculate BMI.    Status: having discomfort    EXAM:  GENERAL: This is a well-developed 67 year old male who appears his stated age  HEAD: normocephalic  HEENT: Pupils equal and reactive bilaterally  CARDIAC: RRR without murmur  CHEST/LUNG:  Clear to auscultation  ABDOMEN: Soft, nontender, nondistended, no masses.  Umbilical region has a little with a small opening drainage appears serous repairs are intact no recurrence is noted.  NEUROLOGIC: Focally intact, nonfocal  VASCULAR: Pulses intact, symmetrical upper and lower extremities.         LABS:  Lab Results   Component Value Date    ALT 21 08/05/2022    AST 23 08/05/2022    ALKPHOS 62 08/05/2022          Assessment/Plan:   Looks to have had a seroma which is opened and drained on the umbilicus which is now improving but on x-ray does get infection does have mesh underneath this.  Therefore recommend IV oral antibiotic treatment and we will send him off on Keflex 750 p.o. twice daily for 7 days CLI goes but does not stop draining to come back and see me hernia appears intact    No follow-ups on file.     Cooper Armijo MD ,MD  [facility] Department of Surgery      Again, thank you for allowing me to participate in the care of  your patient.        Sincerely,        Cooper Armijo MD

## 2023-01-06 NOTE — TELEPHONE ENCOUNTER
I spoke with Jamel. He underwent umbilical hernia and left inguinal hernia repair with Dr. Armijo on 9/16/2022. He saw ASMITA on 9/28/22 for a post op appointment and had a seroma and mild skin infection. He was started on Keflex 500 mg 3 times daily for 5 days. Pt states it did get better for several weeks, but has been worsening over the last 2. He reports a fair amount of thick, yellowish drainage and tenderness around the umbilical incision. He called today with concerns for infection. The pt does live close in [place] and I offered an appointment this afternoon with Dr. Armijo. Informed him it could be residual seroma/serous fluid, however I do think he should be evaluated.

## 2023-01-09 NOTE — PROGRESS NOTES
[facility] Surgery Follow up    HPI:    67 year old year old male who returns for a follow up.  The patient had a hernia pair back in September this is a laparoscopic repair and also umbilical hernia repair.  He is generally quite well but had some drainage from his umbilical incision here of lately which has been serous output.  Also some groin pain on the left side rehabs repair intermittently.    Allergies:Banana, Nuts - unspecified [nuts], and Other environmental allergy    Past Medical History:   Diagnosis Date     Chronic sinusitis      GERD (gastroesophageal reflux disease)      Hyperlipemia      Hypertension      Lumbar spondylolysis      Peripheral neuropathy      Renal disease        Past Surgical History:   Procedure Laterality Date     COLONOSCOPY       HERNIORRHAPHY UMBILICAL N/A 9/16/2022    Procedure: HERNIORRHAPHY, UMBILICAL, OPEN;  Surgeon: Cooper Armijo MD;  Location: [facility]     LAPAROSCOPIC HERNIORRHAPHY INGUINAL Left 9/16/2022    Procedure: HERNIORRHAPHY, INGUINAL, LAPAROSCOPIC, HERNIORRHAPHY, UMBILICAL, OPEN;  Surgeon: Cooper Armijo MD;  Location: [facility]     OTHER SURGICAL HISTORY      vastectomy     OTHER SURGICAL HISTORY Bilateral 2007    carpal tunnel repair       CURRENT MEDS:  Current Outpatient Medications   Medication     cephALEXin (KEFLEX) 750 MG capsule     acetaminophen (TYLENOL) 500 MG tablet     atenolol (TENORMIN) 25 MG tablet     atorvastatin (LIPITOR) 20 MG tablet     docusate sodium (COLACE) 100 MG capsule     gabapentin (NEURONTIN) 300 MG capsule     loratadine (CLARITIN) 10 MG tablet     losartan (COZAAR) 100 MG tablet     mometasone (ELOCON) 0.1 % external cream     Multiple Vitamins-Minerals (CENTRUM SILVER 50+MEN) TABS     omeprazole (PRILOSEC) 20 MG DR capsule     silver sulfADIAZINE (SILVADENE) 1 % external cream     vitamin D3 (CHOLECALCIFEROL) 125 MCG (5000 UT) tablet     No current facility-administered medications for this visit.        History reviewed. No pertinent family history.     reports that he has never smoked. He has never used smokeless tobacco. He reports that he does not currently use alcohol. He reports that he does not currently use drugs.    Review of Systems:  Negative except drainage from the umbilical incision and pain in his left groin.  No fevers or chills no systemic symptoms    OBJECTIVE:  Vitals:    01/06/23 1431   BP: 130/80     There is no height or weight on file to calculate BMI.    Status: having discomfort    EXAM:  GENERAL: This is a well-developed 67 year old male who appears his stated age  HEAD: normocephalic  HEENT: Pupils equal and reactive bilaterally  CARDIAC: RRR without murmur  CHEST/LUNG:  Clear to auscultation  ABDOMEN: Soft, nontender, nondistended, no masses.  Umbilical region has a little with a small opening drainage appears serous repairs are intact no recurrence is noted.  NEUROLOGIC: Focally intact, nonfocal  VASCULAR: Pulses intact, symmetrical upper and lower extremities.         LABS:  Lab Results   Component Value Date    ALT 21 08/05/2022    AST 23 08/05/2022    ALKPHOS 62 08/05/2022          Assessment/Plan:   Looks to have had a seroma which is opened and drained on the umbilicus which is now improving but on x-ray does get infection does have mesh underneath this.  Therefore recommend IV oral antibiotic treatment and we will send him off on Keflex 750 p.o. twice daily for 7 days CLI goes but does not stop draining to come back and see me hernia appears intact    No follow-ups on file.     Cooper Armijo MD ,MD  [facility] Department of Surgery

## 2023-01-13 ENCOUNTER — LAB REQUISITION (OUTPATIENT)
Dept: LAB | Facility: CLINIC | Age: 68
End: 2023-01-13
Payer: COMMERCIAL

## 2023-01-13 ENCOUNTER — TRANSFERRED RECORDS (OUTPATIENT)
Dept: HEALTH INFORMATION MANAGEMENT | Facility: CLINIC | Age: 68
End: 2023-01-13

## 2023-01-13 DIAGNOSIS — R55 SYNCOPE AND COLLAPSE: ICD-10-CM

## 2023-01-13 LAB
ALBUMIN SERPL BCG-MCNC: 4.3 G/DL (ref 3.5–5.2)
ALP SERPL-CCNC: 68 U/L (ref 40–129)
ALT SERPL W P-5'-P-CCNC: 23 U/L (ref 10–50)
ANION GAP SERPL CALCULATED.3IONS-SCNC: 12 MMOL/L (ref 7–15)
AST SERPL W P-5'-P-CCNC: 23 U/L (ref 10–50)
BILIRUB SERPL-MCNC: 0.4 MG/DL
BUN SERPL-MCNC: 17.8 MG/DL (ref 8–23)
CALCIUM SERPL-MCNC: 9.8 MG/DL (ref 8.8–10.2)
CHLORIDE SERPL-SCNC: 104 MMOL/L (ref 98–107)
CREAT SERPL-MCNC: 0.94 MG/DL (ref 0.67–1.17)
DEPRECATED HCO3 PLAS-SCNC: 27 MMOL/L (ref 22–29)
GFR SERPL CREATININE-BSD FRML MDRD: 89 ML/MIN/1.73M2
GLUCOSE SERPL-MCNC: 102 MG/DL (ref 70–99)
POTASSIUM SERPL-SCNC: 4.6 MMOL/L (ref 3.4–5.3)
PROT SERPL-MCNC: 6.6 G/DL (ref 6.4–8.3)
SODIUM SERPL-SCNC: 143 MMOL/L (ref 136–145)

## 2023-01-13 PROCEDURE — 80053 COMPREHEN METABOLIC PANEL: CPT | Mod: ORL | Performed by: FAMILY MEDICINE

## 2023-01-17 ENCOUNTER — TRANSFERRED RECORDS (OUTPATIENT)
Dept: HEALTH INFORMATION MANAGEMENT | Facility: CLINIC | Age: 68
End: 2023-01-17

## 2023-01-17 LAB — EJECTION FRACTION: NORMAL %

## 2023-02-28 ENCOUNTER — OFFICE VISIT (OUTPATIENT)
Dept: PALLIATIVE MEDICINE | Facility: OTHER | Age: 68
End: 2023-02-28
Attending: STUDENT IN AN ORGANIZED HEALTH CARE EDUCATION/TRAINING PROGRAM
Payer: COMMERCIAL

## 2023-02-28 VITALS
WEIGHT: 245 LBS | HEART RATE: 68 BPM | HEIGHT: 69 IN | SYSTOLIC BLOOD PRESSURE: 141 MMHG | BODY MASS INDEX: 36.29 KG/M2 | OXYGEN SATURATION: 95 % | DIASTOLIC BLOOD PRESSURE: 88 MMHG

## 2023-02-28 DIAGNOSIS — Z98.890 HISTORY OF HERNIORRHAPHY: Primary | ICD-10-CM

## 2023-02-28 DIAGNOSIS — N50.812 TESTICULAR PAIN, LEFT: ICD-10-CM

## 2023-02-28 DIAGNOSIS — Z87.19 HISTORY OF HERNIORRHAPHY: Primary | ICD-10-CM

## 2023-02-28 DIAGNOSIS — S30.1XXA ABDOMINAL WALL SEROMA, INITIAL ENCOUNTER: ICD-10-CM

## 2023-02-28 PROCEDURE — 99203 OFFICE O/P NEW LOW 30 MIN: CPT | Performed by: STUDENT IN AN ORGANIZED HEALTH CARE EDUCATION/TRAINING PROGRAM

## 2023-02-28 PROCEDURE — G0463 HOSPITAL OUTPT CLINIC VISIT: HCPCS

## 2023-02-28 PROCEDURE — G0463 HOSPITAL OUTPT CLINIC VISIT: HCPCS | Performed by: STUDENT IN AN ORGANIZED HEALTH CARE EDUCATION/TRAINING PROGRAM

## 2023-02-28 ASSESSMENT — PAIN SCALES - PAIN ENJOYMENT GENERAL ACTIVITY SCALE (PEG)
AVG_PAIN_PASTWEEK: 3
AVG_PAIN_PASTWEEK: 3
INTERFERED_ENJOYMENT_LIFE: 3
INTERFERED_ENJOYMENT_LIFE: 3
PEG_TOTALSCORE: 2.67
INTERFERED_GENERAL_ACTIVITY: 2
INTERFERED_GENERAL_ACTIVITY: 2
PEG_TOTALSCORE: 2.67

## 2023-02-28 ASSESSMENT — ANXIETY QUESTIONNAIRES
2. NOT BEING ABLE TO STOP OR CONTROL WORRYING: NOT AT ALL
3. WORRYING TOO MUCH ABOUT DIFFERENT THINGS: NOT AT ALL
8. IF YOU CHECKED OFF ANY PROBLEMS, HOW DIFFICULT HAVE THESE MADE IT FOR YOU TO DO YOUR WORK, TAKE CARE OF THINGS AT HOME, OR GET ALONG WITH OTHER PEOPLE?: NOT DIFFICULT AT ALL
7. FEELING AFRAID AS IF SOMETHING AWFUL MIGHT HAPPEN: NOT AT ALL
IF YOU CHECKED OFF ANY PROBLEMS ON THIS QUESTIONNAIRE, HOW DIFFICULT HAVE THESE PROBLEMS MADE IT FOR YOU TO DO YOUR WORK, TAKE CARE OF THINGS AT HOME, OR GET ALONG WITH OTHER PEOPLE: NOT DIFFICULT AT ALL
5. BEING SO RESTLESS THAT IT IS HARD TO SIT STILL: NOT AT ALL
4. TROUBLE RELAXING: NOT AT ALL
GAD7 TOTAL SCORE: 0
6. BECOMING EASILY ANNOYED OR IRRITABLE: NOT AT ALL
1. FEELING NERVOUS, ANXIOUS, OR ON EDGE: NOT AT ALL
7. FEELING AFRAID AS IF SOMETHING AWFUL MIGHT HAPPEN: NOT AT ALL
GAD7 TOTAL SCORE: 0

## 2023-02-28 ASSESSMENT — PAIN SCALES - GENERAL: PAINLEVEL: MILD PAIN (3)

## 2023-02-28 NOTE — PATIENT INSTRUCTIONS
Procedures: left lioinguinal/iliohypogastric nerve block ordered  Physical Therapy: patient works 3x/week and on off days he exercises at the [facility]  Diagnostic Studies: none  Medication Management:   Continue Gabapentin 300mg TID - can go up to 600mg TID if needed  Follow up: 2 weeks post-procedure    Tony Juan MD  Interventional Pain Medicine  [facility]    Clinic Number:  [phone]  Call with any questions about your care and for scheduling assistance.   Calls are returned Monday through Friday between 8 AM and 4:30 PM. We usually get back to you within 2 business days depending on the issue/request.    If we are prescribing your medications:  For opioid medication refills, call the clinic or send a [employer] message 7 days in advance.  Please include:  Name of requested medication  Name of the pharmacy.  For non-opioid medications, call your pharmacy directly to request a refill. Please allow 3-4 days to be processed.   Per [place] State Law:  All controlled substance prescriptions must be filled within 30 days of being written.    For those controlled substances allowing refills, pickup must occur within 30 days of last fill.      We believe regular attendance is key to your success in our program!    Any time you are unable to keep your appointment we ask that you call us at least 24 hours in advance to cancel.This will allow us to offer the appointment time to another patient.   Multiple missed appointments may lead to dismissal from the clinic.
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
## 2023-02-28 NOTE — NURSING NOTE
Patient presents to the clinic today for a follow up with Tony Juan MD  regarding Pain Management.     Chief Complaint   Patient presents with     Pain     Lower left abdomen and left testicle- Hernia surgery- Since then, lingering pain. Surgery in August 2022         PEG Score 2/28/2023   PEG Total Score 2.67          Worst pain- 6 or 7. Has not got that way for weeks. Since the increase in gabapentin from PCP. The pain has relived a bit.         Nupur Miranda MA  [facility]

## 2023-03-08 ENCOUNTER — PREP FOR PROCEDURE (OUTPATIENT)
Dept: PALLIATIVE MEDICINE | Facility: OTHER | Age: 68
End: 2023-03-08

## 2023-03-08 ENCOUNTER — OFFICE VISIT (OUTPATIENT)
Dept: CARDIOLOGY | Facility: CLINIC | Age: 68
End: 2023-03-08
Payer: COMMERCIAL

## 2023-03-08 VITALS
HEIGHT: 70 IN | HEART RATE: 64 BPM | RESPIRATION RATE: 16 BRPM | SYSTOLIC BLOOD PRESSURE: 129 MMHG | BODY MASS INDEX: 36.79 KG/M2 | WEIGHT: 257 LBS | DIASTOLIC BLOOD PRESSURE: 86 MMHG | OXYGEN SATURATION: 95 %

## 2023-03-08 DIAGNOSIS — R55 SYNCOPE, UNSPECIFIED SYNCOPE TYPE: Primary | ICD-10-CM

## 2023-03-08 DIAGNOSIS — Z87.19 HISTORY OF HERNIORRHAPHY: Primary | ICD-10-CM

## 2023-03-08 DIAGNOSIS — Z98.890 HISTORY OF HERNIORRHAPHY: Primary | ICD-10-CM

## 2023-03-08 PROCEDURE — 99204 OFFICE O/P NEW MOD 45 MIN: CPT | Performed by: INTERNAL MEDICINE

## 2023-03-08 NOTE — PROGRESS NOTES
[facility]  [phone]      Assessment/Recommendations   Patient with known hypertension, hyperlipidemia who has had recurrent syncopal episodes while on airline flights.  He has passed out about 10 times.  His blood pressure check after passing out and heart rate have been unremarkable.  He was recently diagnosed with quite significant sleep apnea and is using CPAP and he is fallen twice since then without any issues.  The symptoms sound like vasovagal syncope with a clammy feeling just preceding the episodes.  Discussion with one of our electrophysiologist suggested that there is more venous pooling in the situation which may make him more prone.    His stress echocardiogram is unremarkable which is comforting.  I recommend that we do a 2-week ACT monitor.  If the this is unremarkable, we would also offer him an implantable device which she can wear while on plane flights and we could then for sure to get a recording of his rhythm at the time of the episode.    I have asked him to move his legs more and exercise with his legs during the flights which he says he is already done some of.  I have also asked him to increase his hydration prior to flights even more by drinking 2 to 3, 16 ounce Gatorade's prior to the flight.    Thank you for allowing us to precipitate in his care.    45 minutes spent with chart review, patient visit, documentation and .     History of Present Illness/Subjective    Mr. Jamel Noel is a 67 year old male with known hypertension and hyperlipidemia.  He is not an exercise person but does walk on a regular basis.  He has had 10 episodes of passing out while on airplane flights.  He is not has not had passing out while he is standing but it always happens when he is sitting.  He is trying to hydrate himself prior to flights and move around more during the flights but it comes on very quickly.  He will feel like his getting lights in front of his eyes,  "get cold clammy sweaty feeling and go out within a few seconds.  There is very little warning.  No tonic-clonic motion and no loss of bowel or bladder function.  He does not have postictal phase but he does feel little tired after these episodes.  The last episode was particularly problematic and they dragged him into the aisle and laid him down and a cardiologist suggested he should have a stress test which she did and it was unremarkable.  He also has quite significant sleep apnea which was recently diagnosed and has been on his CPAP and ask for about the last 6 weeks.  He is flown twice since treating the sleep apnea and not had any difficulty.    He is never passed out when he has not been on an airplane.  The exception to this was as a kid he would pass out up until luis high age.  This would occur when he was in crowded situations and occurred in [employer] a few times as well.    He denies orthopnea, paroxysmal nocturnal dyspnea, and has mild peripheral edema which has been stable.  He had been on atenolol 50 mg a day which was discontinued during hospitalization in December but was restarted at 25 mg a day.  He had sinus pauses on the amlodipine and likely had untreated sleep apnea at that time as well.    He has no history rheumatic fever, cerebrovascular accident or TIA.    He does not smoke cigarettes, has been treated for hypertension hyperlipidemia but is not diabetic.  His father  of a myocardial infarction at age 73.        ECG: Personally reviewed. ECG from entire are reviewed and is unremarkable       Physical Examination Review of Systems   /86 (BP Location: Right arm, Patient Position: Sitting, Cuff Size: Adult Large)   Pulse 64   Resp 16   Ht 1.778 m (5' 10\")   Wt 116.6 kg (257 lb)   SpO2 95%   BMI 36.88 kg/m    Body mass index is 36.88 kg/m .  Wt Readings from Last 3 Encounters:   23 116.6 kg (257 lb)   23 111.1 kg (245 lb)   22 113.2 kg (249 lb 8 oz)     General " "Appearance:   Alert, cooperative and in no acute distress.   ENT/Mouth: Patient wearing a mask.      EYES:  no scleral icterus, normal conjunctivae   Neck: JVP normal. No Hepatojugular reflux. Thyroid not visualized.   Chest/Lungs:   Lungs are clear to auscultation, equal chest wall expansion.   Cardiovascular:   S1, S2 without murmur ,clicks or rubs. Brachial, radial and posterior tibial pulses are intact and symetric. No carotid bruits noted   Abdomen:  Nontender. BS+. No bruits.   Extremities: No cyanosis, clubbing and mild bilateral pretibial edema   Skin: no xanthelasma, warm.    Neurologic: normal arm movement bilateral, no tremors     Psychiatric: Appropriate affect.      Enc Vitals  BP: 129/86  Pulse: 64  Resp: 16  SpO2: 95 %  Weight: 116.6 kg (257 lb)  Height: 177.8 cm (5' 10\")                                           Medical History  Surgical History Family History Social History   Past Medical History:   Diagnosis Date     Chronic sinusitis      GERD (gastroesophageal reflux disease)      Hyperlipemia      Hypertension      Lumbar spondylolysis      Peripheral neuropathy      Renal disease     Past Surgical History:   Procedure Laterality Date     COLONOSCOPY       HERNIORRHAPHY UMBILICAL N/A 9/16/2022    Procedure: HERNIORRHAPHY, UMBILICAL, OPEN;  Surgeon: Cooper Armijo MD;  Location: [facility]     LAPAROSCOPIC HERNIORRHAPHY INGUINAL Left 9/16/2022    Procedure: HERNIORRHAPHY, INGUINAL, LAPAROSCOPIC, HERNIORRHAPHY, UMBILICAL, OPEN;  Surgeon: Cooper Armijo MD;  Location: [facility]     OTHER SURGICAL HISTORY      vastectomy     OTHER SURGICAL HISTORY Bilateral 2007    carpal tunnel repair    No family history on file. Social History     Socioeconomic History     Marital status:      Spouse name: Not on file     Number of children: Not on file     Years of education: Not on file     Highest education level: Not on file   Occupational History     Not on file   Tobacco Use "     Smoking status: Never     Smokeless tobacco: Never   Substance and Sexual Activity     Alcohol use: Not Currently     Comment: Alcoholic Drinks/day: no drinking for 5 years     Drug use: Never     Sexual activity: Not on file   Other Topics Concern     Not on file   Social History Narrative     Not on file     Social Determinants of Health     Financial Resource Strain: Not on file   Food Insecurity: Not on file   Transportation Needs: Not on file   Physical Activity: Not on file   Stress: Not on file   Social Connections: Not on file   Intimate Partner Violence: Not on file   Housing Stability: Not on file          Medications  Allergies   Current Outpatient Medications   Medication Sig Dispense Refill     acetaminophen (TYLENOL) 500 MG tablet [ACETAMINOPHEN (TYLENOL) 500 MG TABLET] Take 1 tablet (500 mg total) by mouth every 4 (four) hours.  0     atenolol (TENORMIN) 25 MG tablet atenolol 25 mg tablet   TAKE 1 TABLET BY MOUTH 1 TIME EACH DAY.       atorvastatin (LIPITOR) 20 MG tablet [ATORVASTATIN (LIPITOR) 20 MG TABLET] Take 20 mg by mouth at bedtime.       docusate sodium (COLACE) 100 MG capsule Take 1 capsule by mouth       gabapentin (NEURONTIN) 300 MG capsule Take 300 mg by mouth 3 times daily       loratadine (CLARITIN) 10 MG tablet Take 1 tablet by mouth       losartan (COZAAR) 100 MG tablet losartan 100 mg tablet   TAKE 1 TABLET BY MOUTH 1 TIME EACH DAY.       omeprazole (PRILOSEC) 20 MG DR capsule Take 1 capsule by mouth       vitamin D3 (CHOLECALCIFEROL) 125 MCG (5000 UT) tablet Take 1 tablet by mouth      Allergies   Allergen Reactions     Banana Itching     Itching throat     Nuts - Unspecified [Nuts] Itching     Itching throat     Other Environmental Allergy Other (See Comments)     Grass Hayfever  Itching throat runny eyes         Lab Results    Chemistry/lipid CBC Cardiac Enzymes/BNP/TSH/INR   Lab Results   Component Value Date    CHOL 124 08/05/2022    HDL 31 (L) 08/05/2022    TRIG 178 (H)  08/05/2022    BUN 17.8 01/13/2023     01/13/2023    CO2 27 01/13/2023    No results found for: WBC, HGB, HCT, MCV, PLT Lab Results   Component Value Date    TSH 3.17 04/13/2022

## 2023-03-08 NOTE — LETTER
3/8/2023    Yvan Vergara MD  [address]  [place] [address]    RE: Jamel Noel       Dear Colleague,     I had the pleasure of seeing Jamel Noel in the [facility].      [facility]  [phone]      Assessment/Recommendations   Patient with known hypertension, hyperlipidemia who has had recurrent syncopal episodes while on airline flights.  He has passed out about 10 times.  His blood pressure check after passing out and heart rate have been unremarkable.  He was recently diagnosed with quite significant sleep apnea and is using CPAP and he is fallen twice since then without any issues.  The symptoms sound like vasovagal syncope with a clammy feeling just preceding the episodes.  Discussion with one of our electrophysiologist suggested that there is more venous pooling in the situation which may make him more prone.    His stress echocardiogram is unremarkable which is comforting.  I recommend that we do a 2-week ACT monitor.  If the this is unremarkable, we would also offer him an implantable device which she can wear while on plane flights and we could then for sure to get a recording of his rhythm at the time of the episode.    I have asked him to move his legs more and exercise with his legs during the flights which he says he is already done some of.  I have also asked him to increase his hydration prior to flights even more by drinking 2 to 3, 16 ounce Gatorade's prior to the flight.    Thank you for allowing us to precipitate in his care.    45 minutes spent with chart review, patient visit, documentation and .     History of Present Illness/Subjective    Mr. Jamel Noel is a 67 year old male with known hypertension and hyperlipidemia.  He is not an exercise person but does walk on a regular basis.  He has had 10 episodes of passing out while on airplane flights.  He is not has not had passing out while he is standing but it  "always happens when he is sitting.  He is trying to hydrate himself prior to flights and move around more during the flights but it comes on very quickly.  He will feel like his getting lights in front of his eyes, get cold clammy sweaty feeling and go out within a few seconds.  There is very little warning.  No tonic-clonic motion and no loss of bowel or bladder function.  He does not have postictal phase but he does feel little tired after these episodes.  The last episode was particularly problematic and they dragged him into the aisle and laid him down and a cardiologist suggested he should have a stress test which she did and it was unremarkable.  He also has quite significant sleep apnea which was recently diagnosed and has been on his CPAP and ask for about the last 6 weeks.  He is flown twice since treating the sleep apnea and not had any difficulty.    He is never passed out when he has not been on an airplane.  The exception to this was as a kid he would pass out up until [employer] age.  This would occur when he was in crowded situations and occurred in [employer] a few times as well.    He denies orthopnea, paroxysmal nocturnal dyspnea, and has mild peripheral edema which has been stable.  He had been on atenolol 50 mg a day which was discontinued during hospitalization in December but was restarted at 25 mg a day.  He had sinus pauses on the amlodipine and likely had untreated sleep apnea at that time as well.    He has no history rheumatic fever, cerebrovascular accident or TIA.    He does not smoke cigarettes, has been treated for hypertension hyperlipidemia but is not diabetic.  His father  of a myocardial infarction at age 73.        ECG: Personally reviewed. ECG from entire are reviewed and is unremarkable       Physical Examination Review of Systems   /86 (BP Location: Right arm, Patient Position: Sitting, Cuff Size: Adult Large)   Pulse 64   Resp 16   Ht 1.778 m (5' 10\")   Wt 116.6 kg " "(257 lb)   SpO2 95%   BMI 36.88 kg/m    Body mass index is 36.88 kg/m .  Wt Readings from Last 3 Encounters:   03/08/23 116.6 kg (257 lb)   02/28/23 111.1 kg (245 lb)   08/19/22 113.2 kg (249 lb 8 oz)     General Appearance:   Alert, cooperative and in no acute distress.   ENT/Mouth: Patient wearing a mask.      EYES:  no scleral icterus, normal conjunctivae   Neck: JVP normal. No Hepatojugular reflux. Thyroid not visualized.   Chest/Lungs:   Lungs are clear to auscultation, equal chest wall expansion.   Cardiovascular:   S1, S2 without murmur ,clicks or rubs. Brachial, radial and posterior tibial pulses are intact and symetric. No carotid bruits noted   Abdomen:  Nontender. BS+. No bruits.   Extremities: No cyanosis, clubbing and mild bilateral pretibial edema   Skin: no xanthelasma, warm.    Neurologic: normal arm movement bilateral, no tremors     Psychiatric: Appropriate affect.      Enc Vitals  BP: 129/86  Pulse: 64  Resp: 16  SpO2: 95 %  Weight: 116.6 kg (257 lb)  Height: 177.8 cm (5' 10\")                                           Medical History  Surgical History Family History Social History   Past Medical History:   Diagnosis Date     Chronic sinusitis      GERD (gastroesophageal reflux disease)      Hyperlipemia      Hypertension      Lumbar spondylolysis      Peripheral neuropathy      Renal disease     Past Surgical History:   Procedure Laterality Date     COLONOSCOPY       HERNIORRHAPHY UMBILICAL N/A 9/16/2022    Procedure: HERNIORRHAPHY, UMBILICAL, OPEN;  Surgeon: Cooper Armijo MD;  Location: [facility]     LAPAROSCOPIC HERNIORRHAPHY INGUINAL Left 9/16/2022    Procedure: HERNIORRHAPHY, INGUINAL, LAPAROSCOPIC, HERNIORRHAPHY, UMBILICAL, OPEN;  Surgeon: Cooper Armijo MD;  Location: [facility]     OTHER SURGICAL HISTORY      vastectomy     OTHER SURGICAL HISTORY Bilateral 2007    carpal tunnel repair    No family history on file. Social History     Socioeconomic History     " Marital status:      Spouse name: Not on file     Number of children: Not on file     Years of education: Not on file     Highest education level: Not on file   Occupational History     Not on file   Tobacco Use     Smoking status: Never     Smokeless tobacco: Never   Substance and Sexual Activity     Alcohol use: Not Currently     Comment: Alcoholic Drinks/day: no drinking for 5 years     Drug use: Never     Sexual activity: Not on file   Other Topics Concern     Not on file   Social History Narrative     Not on file     Social Determinants of Health     Financial Resource Strain: Not on file   Food Insecurity: Not on file   Transportation Needs: Not on file   Physical Activity: Not on file   Stress: Not on file   Social Connections: Not on file   Intimate Partner Violence: Not on file   Housing Stability: Not on file          Medications  Allergies   Current Outpatient Medications   Medication Sig Dispense Refill     acetaminophen (TYLENOL) 500 MG tablet [ACETAMINOPHEN (TYLENOL) 500 MG TABLET] Take 1 tablet (500 mg total) by mouth every 4 (four) hours.  0     atenolol (TENORMIN) 25 MG tablet atenolol 25 mg tablet   TAKE 1 TABLET BY MOUTH 1 TIME EACH DAY.       atorvastatin (LIPITOR) 20 MG tablet [ATORVASTATIN (LIPITOR) 20 MG TABLET] Take 20 mg by mouth at bedtime.       docusate sodium (COLACE) 100 MG capsule Take 1 capsule by mouth       gabapentin (NEURONTIN) 300 MG capsule Take 300 mg by mouth 3 times daily       loratadine (CLARITIN) 10 MG tablet Take 1 tablet by mouth       losartan (COZAAR) 100 MG tablet losartan 100 mg tablet   TAKE 1 TABLET BY MOUTH 1 TIME EACH DAY.       omeprazole (PRILOSEC) 20 MG DR capsule Take 1 capsule by mouth       vitamin D3 (CHOLECALCIFEROL) 125 MCG (5000 UT) tablet Take 1 tablet by mouth      Allergies   Allergen Reactions     Banana Itching     Itching throat     Nuts - Unspecified [Nuts] Itching     Itching throat     Other Environmental Allergy Other (See Comments)      Grass Hayfever  Itching throat runny eyes         Lab Results    Chemistry/lipid CBC Cardiac Enzymes/BNP/TSH/INR   Lab Results   Component Value Date    CHOL 124 08/05/2022    HDL 31 (L) 08/05/2022    TRIG 178 (H) 08/05/2022    BUN 17.8 01/13/2023     01/13/2023    CO2 27 01/13/2023    No results found for: WBC, HGB, HCT, MCV, PLT Lab Results   Component Value Date    TSH 3.17 04/13/2022                    Thank you for allowing me to participate in the care of your patient.    Sincerely,     Omid Francois MD     [facility]

## 2023-03-14 ENCOUNTER — HOSPITAL ENCOUNTER (OUTPATIENT)
Dept: CARDIOLOGY | Facility: HOSPITAL | Age: 68
Discharge: HOME | End: 2023-03-14
Attending: INTERNAL MEDICINE | Admitting: INTERNAL MEDICINE
Payer: COMMERCIAL

## 2023-03-14 ENCOUNTER — PREP FOR PROCEDURE (OUTPATIENT)
Dept: PALLIATIVE MEDICINE | Facility: OTHER | Age: 68
End: 2023-03-14
Payer: COMMERCIAL

## 2023-03-14 ENCOUNTER — TELEPHONE (OUTPATIENT)
Dept: PALLIATIVE MEDICINE | Facility: OTHER | Age: 68
End: 2023-03-14
Payer: COMMERCIAL

## 2023-03-14 DIAGNOSIS — Z98.890 HISTORY OF HERNIA REPAIR: Primary | ICD-10-CM

## 2023-03-14 DIAGNOSIS — Z87.19 HISTORY OF HERNIA REPAIR: Primary | ICD-10-CM

## 2023-03-14 DIAGNOSIS — R55 SYNCOPE, UNSPECIFIED SYNCOPE TYPE: ICD-10-CM

## 2023-03-14 PROCEDURE — 93270 REMOTE 30 DAY ECG REV/REPORT: CPT

## 2023-03-14 NOTE — TELEPHONE ENCOUNTER
Patient is scheduled for procedure with Dr. Juan     Spoke with: Patient     Date of Procedure: 3/24     Location: [facility]     Informed patient they will need an adult  Yes     Additional comments: Spoke with patient, he is aware of above date and time,. Will reach out with any questions     Patient is aware pre-op RN will call 2-3 days prior to procedure with arrival time and instructions      Anna C. Schoenecker on 3/14/2023 at 2:11 PM

## 2023-03-24 ENCOUNTER — ANCILLARY PROCEDURE (OUTPATIENT)
Dept: RADIOLOGY | Facility: AMBULATORY SURGERY CENTER | Age: 68
End: 2023-03-24
Attending: STUDENT IN AN ORGANIZED HEALTH CARE EDUCATION/TRAINING PROGRAM
Payer: COMMERCIAL

## 2023-03-24 ENCOUNTER — ANCILLARY ORDERS (OUTPATIENT)
Dept: PALLIATIVE MEDICINE | Facility: OTHER | Age: 68
End: 2023-03-24

## 2023-03-24 ENCOUNTER — HOSPITAL ENCOUNTER (OUTPATIENT)
Facility: AMBULATORY SURGERY CENTER | Age: 68
Discharge: HOME | End: 2023-03-24
Attending: STUDENT IN AN ORGANIZED HEALTH CARE EDUCATION/TRAINING PROGRAM | Admitting: STUDENT IN AN ORGANIZED HEALTH CARE EDUCATION/TRAINING PROGRAM
Payer: COMMERCIAL

## 2023-03-24 VITALS
RESPIRATION RATE: 16 BRPM | TEMPERATURE: 98.2 F | HEART RATE: 67 BPM | SYSTOLIC BLOOD PRESSURE: 142 MMHG | OXYGEN SATURATION: 96 % | DIASTOLIC BLOOD PRESSURE: 88 MMHG

## 2023-03-24 DIAGNOSIS — R52 PAIN: ICD-10-CM

## 2023-03-24 DIAGNOSIS — Z98.890 HISTORY OF HERNIA REPAIR: ICD-10-CM

## 2023-03-24 DIAGNOSIS — Z87.19 HISTORY OF HERNIA REPAIR: ICD-10-CM

## 2023-03-24 PROCEDURE — 64425 NJX AA&/STRD II IH NERVES: CPT | Mod: LT | Performed by: STUDENT IN AN ORGANIZED HEALTH CARE EDUCATION/TRAINING PROGRAM

## 2023-03-24 PROCEDURE — 76942 ECHO GUIDE FOR BIOPSY: CPT | Mod: 26 | Performed by: STUDENT IN AN ORGANIZED HEALTH CARE EDUCATION/TRAINING PROGRAM

## 2023-03-24 PROCEDURE — 64425 NJX AA&/STRD II IH NERVES: CPT | Mod: LT

## 2023-03-24 RX ORDER — LIDOCAINE HYDROCHLORIDE 10 MG/ML
INJECTION, SOLUTION EPIDURAL; INFILTRATION; INTRACAUDAL; PERINEURAL PRN
Status: DISCONTINUED | OUTPATIENT
Start: 2023-03-24 | End: 2023-03-24 | Stop reason: HOSPADM

## 2023-03-24 RX ORDER — TRIAMCINOLONE ACETONIDE 40 MG/ML
INJECTION, SUSPENSION INTRA-ARTICULAR; INTRAMUSCULAR PRN
Status: DISCONTINUED | OUTPATIENT
Start: 2023-03-24 | End: 2023-03-24 | Stop reason: HOSPADM

## 2023-03-24 NOTE — H&P
Jamel Noel  [id number]  male  67 year old      Reason for procedure/surgery: left post-herniorrhaphy pain syndrome    Patient Active Problem List   Diagnosis     Spinal stenosis of lumbar region at multiple levels     Left inguinal hernia     Umbilical hernia with obstruction     History of hernia repair       Past Surgical History:    Past Surgical History:   Procedure Laterality Date     COLONOSCOPY       HERNIORRHAPHY UMBILICAL N/A 9/16/2022    Procedure: HERNIORRHAPHY, UMBILICAL, OPEN;  Surgeon: Cooper Armijo MD;  Location: [facility]     LAPAROSCOPIC HERNIORRHAPHY INGUINAL Left 9/16/2022    Procedure: HERNIORRHAPHY, INGUINAL, LAPAROSCOPIC, HERNIORRHAPHY, UMBILICAL, OPEN;  Surgeon: Cooper Armijo MD;  Location: [facility]     OTHER SURGICAL HISTORY      vastectomy     OTHER SURGICAL HISTORY Bilateral 2007    carpal tunnel repair       Past Medical History:   Past Medical History:   Diagnosis Date     Chronic sinusitis      GERD (gastroesophageal reflux disease)      Hyperlipemia      Hypertension      Lumbar spondylolysis      Peripheral neuropathy      Renal disease        Social History:   Social History     Tobacco Use     Smoking status: Never     Smokeless tobacco: Never   Substance Use Topics     Alcohol use: Not Currently     Comment: Alcoholic Drinks/day: no drinking for 5 years       Family History: History reviewed. No pertinent family history.    Allergies:   Allergies   Allergen Reactions     Banana Itching     Itching throat     Nuts - Unspecified [Nuts] Itching     Itching throat     Other Environmental Allergy Other (See Comments)     Grass Hayfever  Itching throat runny eyes       Active Medications:   Current Outpatient Medications   Medication Sig Dispense Refill     acetaminophen (TYLENOL) 500 MG tablet [ACETAMINOPHEN (TYLENOL) 500 MG TABLET] Take 1 tablet (500 mg total) by mouth every 4 (four) hours.  0     atenolol (TENORMIN) 25 MG tablet atenolol 25 mg tablet    TAKE 1 TABLET BY MOUTH 1 TIME EACH DAY.       atorvastatin (LIPITOR) 20 MG tablet [ATORVASTATIN (LIPITOR) 20 MG TABLET] Take 20 mg by mouth at bedtime.       docusate sodium (COLACE) 100 MG capsule Take 1 capsule by mouth       gabapentin (NEURONTIN) 300 MG capsule Take 300 mg by mouth 3 times daily       loratadine (CLARITIN) 10 MG tablet Take 1 tablet by mouth       losartan (COZAAR) 100 MG tablet losartan 100 mg tablet   TAKE 1 TABLET BY MOUTH 1 TIME EACH DAY.       omeprazole (PRILOSEC) 20 MG DR capsule Take 1 capsule by mouth       vitamin D3 (CHOLECALCIFEROL) 125 MCG (5000 UT) tablet Take 1 tablet by mouth         Systemic Review:   CONSTITUTIONAL: NEGATIVE for fever, chills, change in weight  ENT/MOUTH: NEGATIVE for ear, mouth and throat problems  RESP: NEGATIVE for significant cough or SOB  CV: NEGATIVE for chest pain, palpitations or peripheral edema    Physical Examination:   Vital Signs: BP (!) 155/86   Pulse 67   Temp 98.2  F (36.8  C)   Resp 19   SpO2 97%   GENERAL: healthy appearing, alert and no distress  NECK: no adenopathy, no asymmetry, masses, or scars  RESP: normal work of breathing on room air  CV: warm, well perfused extremities, normal capillary refill  ABDOMEN: nondistended  MS: no gross musculoskeletal defects noted, no edema    Plan: Appropriate to proceed as scheduled.    Tony Juan MD  Interventional Pain Medicine  [facility]    3/24/2023

## 2023-03-24 NOTE — OP NOTE
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  Questions were answered to his  satisfaction and she agrees to proceed. Voluntary informed consent was obtained and signed.     Vitals were reviewed: Yes  Allergies were reviewed:  Yes   Medications were reviewed:  Yes   Pre-procedure pain score: 3/10    Procedure:  After getting informed consent, patient was brought into the procedure suite and was placed in a supine position on the procedure table.       A procedural pause verifying correct patient name, allergies, and surgical site was performed immediately prior to beginning the procedure.    Using ultrasound guidance a linear probe was placed laterally on a line between the ASIS and umbilius. The External oblique, Internal Oblique, Transversus Abdominis and Peritoneum were identified.    In this position using an in plane technique, the skin overlying the entry site and subcutaneous tissue estimating the procedure needle trajectory was anesthetized using 2mL of 1% lidocaine with a 25-gauge, 1.5 inch needle.    A 22g 100mm Pajunk needle was utilized to optimize in plane needle visualization.  This needle was placed through the anesthetized skin entry site, with a syringe containing preservative free normal saline connected via extension tubing.    The needle was visualized in plane to approach the tissue plane between the transversus  abdominis and external oblique.    Using the attached syringe preservative-free normal saline was injected to visualize separation of the two muscle layers. Once verified without moving the needle, the saline syringe was removed from the extension tubing and the syringe containing 4mL ropivacaine 0.5% and 40mg triamcinolone was connected to the extension tubing and injected.     Hemostasis was achieved, the area was cleaned, and bandaids were placed when appropriate.    The patient tolerated the procedure well. The patient was carefully escorted to the recovery room in stable condition. After meeting discharge criteria, the patient was discharged home.      Post-procedure pain score: see flowsheet  Follow-up includes:   -f/u with referring provider    Tony Juan MD  Interventional Pain Medicine  [facility]

## 2023-03-24 NOTE — DISCHARGE INSTRUCTIONS
"Home Care Instructions after an Ilioinguinal Nerve Block    An ilioinguinal nerve block is used in the treatment of persistent pain from inflammation or entrapment of the ilioinguinal nerve. The ilioinguinal nerve provides sensation to the upper inner thigh, groin, and perineum. Your physician used ultrasound to help guide and administer anesthetic medication around this nerve. Steroids are sometimes used  to increase the effectiveness and length of relief.     Activity  -Resume normal activity  -DO NOT shower for 24 hours  -DO NOT remove bandaid for 24 hours    Pain  -You may experience soreness at the injection site for one or two days  -You may use an ice pack for 20 minutes every 2 hours for the first 24 hours  -You may use Tylenol (acetaminophen) every 4 hours or other pain medicines as     directed by your physician    You may experience numbness in your groin, near and around the injection site. It is common to feel a fluid pocket or some puffiness in the groin area after the injection.    DID YOU RECEIVE STEROIDS TODAY?  {YES / NO:061535::\"Yes\"}    Common side effects of steroids:  Not everyone will experience corticosteroid side effects. If side effects are experienced, they will gradually subside in the 7-10 day period following an injection. Most common side effects include:  -Flushed face and/or chest  -Feeling of warmth, particularly in the face but could be an overall feeling of warmth  -Increased blood sugar in diabetic patients  -Menstrual irregularities my occur. If taking hormone-based birth control an alternate method of birth control is recommended  -Sleep disturbances and/or mood swings are possible   -Leg cramps    PLEASE KEEP TRACK OF YOUR SYMPTOMS AND NOTE YOUR IMPROVEMENT FOR YOUR DOCTOR.     Please contact us if you have the following side effects:  -Severe pain  -Fever more than 101.5 degrees Fahrenheit  -Signs of infection at the injection site (redness, swelling, or drainage)    FOR PAIN " CENTER PATIENTS:  If you have questions, please contact the Pain Clinic at [phone] Option #1 between the hours of 7:00 am and 3:00 pm Monday through Friday. After office hours you can contact the on call provider by dialing [phone]. If you need immediate attention, we recommend that you go to a hospital emergency room or dial [phone].

## 2023-03-30 PROCEDURE — 93272 ECG/REVIEW INTERPRET ONLY: CPT | Performed by: INTERNAL MEDICINE

## 2023-04-04 ENCOUNTER — TELEPHONE (OUTPATIENT)
Dept: CARDIOLOGY | Facility: CLINIC | Age: 68
End: 2023-04-04
Payer: COMMERCIAL

## 2023-04-04 ENCOUNTER — DOCUMENTATION ONLY (OUTPATIENT)
Dept: CARDIOLOGY | Facility: CLINIC | Age: 68
End: 2023-04-04
Payer: COMMERCIAL

## 2023-04-04 DIAGNOSIS — R55 SYNCOPE, UNSPECIFIED SYNCOPE TYPE: Primary | ICD-10-CM

## 2023-04-04 RX ORDER — LIDOCAINE 40 MG/G
CREAM TOPICAL
Status: CANCELLED | OUTPATIENT
Start: 2023-04-04

## 2023-04-04 RX ORDER — FENTANYL CITRATE 50 UG/ML
25 INJECTION, SOLUTION INTRAMUSCULAR; INTRAVENOUS
Status: CANCELLED | OUTPATIENT
Start: 2023-04-25

## 2023-04-04 NOTE — PROGRESS NOTES
H&P  PMD: []  Received [] Card OV: []  Date:  Teach  []   Orders  I [x] P  [x]  Letter []   AC: None- NA All other AM Meds: Take All     1955  Home:[phone] (home) Cell:[phone] (mobile)  Emergency Contact: Marichuy Noel [phone]  PCP: Yvan Vergara, [phone]      Important patient information for [facility]SC/Cath Lab staff : None    [facility] EP Cath Lab Procedure Order     Device Implant/Revision:  Procedure: New Implant  Current Device/Device Co Needed for Procedure: loop [employer]  Ordering Provider: Dr Goldsmith  Ordering Date: 4/4/2023  Diagnosis:  Syncope  Scheduling Timeframe:  Next Available  Scheduling Restrictions: None  Scheduling Contact: Please contact pt to schedule, if you are unable to schedule date within the next 24 hours please contact pt to update on scheduling process  Scheduling Follow-up apt for NEW HIS/CRT Implants: NA  Pre-Procedural Testing needed: None  Anesthesia:  Conscious Sedation- CV RN to administer    [facility] EP Cath Lab Prep   H&P:  Pt to schedule with PMD to complete  Pre-op Labs: CBC, BMP, Beta HcG if appropriate, and INR if on Warfarin will be ordered AM of procedure and Review of most recent labs, WEL for procedure  T&S Pre-Procedure Review: T&S is not required for procedure  Medical Records Pertinent for Procedure:  N/A  Allergies: Reviewed allergies, no concerns regarding orders for procedure    Allergies   Allergen Reactions     Banana Itching     Itching throat     Nuts - Unspecified [Nuts] Itching     Itching throat     Other Environmental Allergy Other (See Comments)     Grass Hayfever  Itching throat runny eyes       Current Outpatient Medications:      acetaminophen (TYLENOL) 500 MG tablet, [ACETAMINOPHEN (TYLENOL) 500 MG TABLET] Take 1 tablet (500 mg total) by mouth every 4 (four) hours., Disp: , Rfl: 0     atenolol (TENORMIN) 25 MG tablet, atenolol 25 mg tablet  TAKE 1 TABLET BY MOUTH 1 TIME EACH DAY., Disp: , Rfl:      atorvastatin (LIPITOR) 20 MG  tablet, [ATORVASTATIN (LIPITOR) 20 MG TABLET] Take 20 mg by mouth at bedtime., Disp: , Rfl:      docusate sodium (COLACE) 100 MG capsule, Take 1 capsule by mouth, Disp: , Rfl:      gabapentin (NEURONTIN) 300 MG capsule, Take 300 mg by mouth 3 times daily, Disp: , Rfl:      loratadine (CLARITIN) 10 MG tablet, Take 1 tablet by mouth, Disp: , Rfl:      losartan (COZAAR) 100 MG tablet, losartan 100 mg tablet  TAKE 1 TABLET BY MOUTH 1 TIME EACH DAY., Disp: , Rfl:      omeprazole (PRILOSEC) 20 MG DR capsule, Take 1 capsule by mouth, Disp: , Rfl:      vitamin D3 (CHOLECALCIFEROL) 125 MCG (5000 UT) tablet, Take 1 tablet by mouth, Disp: , Rfl:     Documentation Date:4/4/2023 3:21 PM  Kaylie Powell RN

## 2023-04-04 NOTE — TELEPHONE ENCOUNTER
EP Case Referral Request    Dr Goldsmith-    Referral Information  Referral for: ILR  Referral request from: Primary Cardiology  Referral Dx: syncope    Pertinent Testing/Information  Echo: 1/17/23  EKG/Monitor: 3/14/23  Misc: None    Medication- See complete medication list below  Anticoagulation: None  AAD: atenolol    Please advise if it is ok to proceed with scheduling, and orders.    Thank you,  Kaylie Powell, RN  4/4/2023 2:37 PM      Current Outpatient Medications:      acetaminophen (TYLENOL) 500 MG tablet, [ACETAMINOPHEN (TYLENOL) 500 MG TABLET] Take 1 tablet (500 mg total) by mouth every 4 (four) hours., Disp: , Rfl: 0     atenolol (TENORMIN) 25 MG tablet, atenolol 25 mg tablet  TAKE 1 TABLET BY MOUTH 1 TIME EACH DAY., Disp: , Rfl:      atorvastatin (LIPITOR) 20 MG tablet, [ATORVASTATIN (LIPITOR) 20 MG TABLET] Take 20 mg by mouth at bedtime., Disp: , Rfl:      docusate sodium (COLACE) 100 MG capsule, Take 1 capsule by mouth, Disp: , Rfl:      gabapentin (NEURONTIN) 300 MG capsule, Take 300 mg by mouth 3 times daily, Disp: , Rfl:      loratadine (CLARITIN) 10 MG tablet, Take 1 tablet by mouth, Disp: , Rfl:      losartan (COZAAR) 100 MG tablet, losartan 100 mg tablet  TAKE 1 TABLET BY MOUTH 1 TIME EACH DAY., Disp: , Rfl:      omeprazole (PRILOSEC) 20 MG DR capsule, Take 1 capsule by mouth, Disp: , Rfl:      vitamin D3 (CHOLECALCIFEROL) 125 MCG (5000 UT) tablet, Take 1 tablet by mouth, Disp: , Rfl:   Allergies   Allergen Reactions     Banana Itching     Itching throat     Nuts - Unspecified [Nuts] Itching     Itching throat     Other Environmental Allergy Other (See Comments)     Grass Hayfever  Itching throat runny eyes

## 2023-04-04 NOTE — TELEPHONE ENCOUNTER
Johnson Goldsmith MD Westlund, Jessica, RN  Caller: Unspecified (Today,  2:37 PM)  Aj Mendoza to schedule with [facility].   Thanks   Johnson

## 2023-04-05 ENCOUNTER — ANCILLARY ORDERS (OUTPATIENT)
Dept: CARDIOLOGY | Facility: CLINIC | Age: 68
End: 2023-04-05

## 2023-04-05 DIAGNOSIS — Z95.818 IMPLANTABLE LOOP RECORDER PRESENT: ICD-10-CM

## 2023-04-07 ENCOUNTER — OFFICE VISIT (OUTPATIENT)
Dept: ANESTHESIOLOGY | Facility: CLINIC | Age: 68
End: 2023-04-07
Attending: STUDENT IN AN ORGANIZED HEALTH CARE EDUCATION/TRAINING PROGRAM
Payer: COMMERCIAL

## 2023-04-07 VITALS — HEART RATE: 71 BPM | SYSTOLIC BLOOD PRESSURE: 127 MMHG | OXYGEN SATURATION: 94 % | DIASTOLIC BLOOD PRESSURE: 82 MMHG

## 2023-04-07 DIAGNOSIS — G57.12 MERALGIA PARESTHETICA OF LEFT SIDE: ICD-10-CM

## 2023-04-07 DIAGNOSIS — R10.32 LEFT GROIN PAIN: Primary | ICD-10-CM

## 2023-04-07 PROCEDURE — 99213 OFFICE O/P EST LOW 20 MIN: CPT | Performed by: STUDENT IN AN ORGANIZED HEALTH CARE EDUCATION/TRAINING PROGRAM

## 2023-04-07 ASSESSMENT — PAIN SCALES - GENERAL: PAINLEVEL: NO PAIN (0)

## 2023-04-07 NOTE — LETTER
4/7/2023       RE: Jamel Noel  [address] [address]     Dear Colleague,    Thank you for referring your patient, Jamel Noel, to the [facility] [place] at [facility]. Please see a copy of my visit note below.                              [facility] Follow-up    Date of visit: 4/7/2023    Chief complaint:   Chief Complaint   Patient presents with    Consult     Follow-up after injections. Pain reduced significantly       Interval history:  Since his last visit, Jamel Noel reports:  Left Ilioinguinal/iliohypogastric block on 3/24/23 - 90% improvement, noticed 3-4 days after procedure. Ongoing    Left anterior thigh pain has flared up, worse with standing, better with laying down, has these flares when standing still for 10-15 minutes.    Pain scores:  Pain intensity currently is 1 on a scale of 0-10.     Current pain medications include:  Gabapentin 300mg TID     Other treatments have included:  Jamel Noel has not been seen at a pain clinic in the past.    PT: none  Injections: none  Surgery:              Left inguinal and umbilical hernia repair with Dr. Armijo               Kidney stone removal  Alternative: none    Side Effects: no side effect    Medications:  Current Outpatient Medications   Medication Sig Dispense Refill    acetaminophen (TYLENOL) 500 MG tablet [ACETAMINOPHEN (TYLENOL) 500 MG TABLET] Take 1 tablet (500 mg total) by mouth every 4 (four) hours.  0    atenolol (TENORMIN) 25 MG tablet atenolol 25 mg tablet   TAKE 1 TABLET BY MOUTH 1 TIME EACH DAY.      atorvastatin (LIPITOR) 20 MG tablet [ATORVASTATIN (LIPITOR) 20 MG TABLET] Take 20 mg by mouth at bedtime.      docusate sodium (COLACE) 100 MG capsule Take 1 capsule by mouth      gabapentin (NEURONTIN) 300 MG capsule Take 300 mg by mouth 3 times daily      loratadine (CLARITIN) 10 MG  tablet Take 1 tablet by mouth      losartan (COZAAR) 100 MG tablet losartan 100 mg tablet   TAKE 1 TABLET BY MOUTH 1 TIME EACH DAY.      omeprazole (PRILOSEC) 20 MG DR capsule Take 1 capsule by mouth      vitamin D3 (CHOLECALCIFEROL) 125 MCG (5000 UT) tablet Take 1 tablet by mouth         Medical History: any changes in medical history since they were last seen? No    Physical Exam:  Blood pressure 127/82, pulse 71, SpO2 94 %.  Constitutional: Well developed, NAD  Head: normocephalic. Atraumatic.   Eyes: no redness or jaundice noted   CV: warm, well perfused extremities   Skin: no suspicious lesions or rashes   Psychiatric: mentation appears normal and affect full  Focused MSK exam: L anterior thigh sensation is intact to pinprick sensation    Assessment:   1. Post-herniorraphy pain syndrome, left  2. Left meralgia paresthetica    Jamel Noel is a 68 year old male who is seen at the pain clinic for left post-hernorrhaphy pain syndrome. We performed a left ilioinguinal/iliohypogastric block which has provided ongoing 90% pain relief. We will repeat the injection as needed, at least 3 months apart. Today patient describes additional new flaring of pain down the anterior thigh, in a linear distribution vertically. It is not associated with numbness or weakness, and it is worse with prolonged standing. Sensation is intact to pinprick exam. I suspect a possible meralgia paresthetica, but was not demonstrated on sensory exam today. We discussed watchful waiting, to which the patient agreed.    Plan:  Physical Therapy:  Continue staying active  Diagnostic Studies:  none  Medication Management:  Continue gabapentin 300mg TID  Further procedures recommended: none for now  Ok to repeat the left ilioinguinal/iliohypogastric nerve block at 3 months if pain returns  Recommendations to PCP: none  Follow up: 3 months    27 minutes spent on the date of encounter doing chart review, history, and exam documentation and further  activities as noted above.         Again, thank you for allowing me to participate in the care of your patient.      Sincerely,    Tony Juan MD

## 2023-04-07 NOTE — PATIENT INSTRUCTIONS
Procedures:    Okay to repeat the left ilioinguinal/iliohypogastric nerve block at 3 months if pain returns      Recommended Follow up:      Follow up in 3 months.          Please call [phone] to schedule your clinic appointment if you don't already have an appointment scheduled.        To speak with a nurse, schedule/reschedule/cancel a clinic appointment, or request a medication refill call: [phone]    You can also reach us by [employer]: [email or web address]

## 2023-04-07 NOTE — NURSING NOTE
RN reviewed AVS with patient. Patient to contact clinic if any questions/concerns. Patient verbalized understanding.    Aura Max RN

## 2023-04-07 NOTE — NURSING NOTE
Patient presents with:  Consult: Follow-up after injections. Pain reduced significantly      No Pain (0)       What medications are you using for pain? Ibuprofen    (Return Patients only) What refills are you needing today? None    Frederic De Leon, EMT

## 2023-04-07 NOTE — PROGRESS NOTES
[facility] Follow-up    Date of visit: 4/7/2023    Chief complaint:   Chief Complaint   Patient presents with     Consult     Follow-up after injections. Pain reduced significantly       Interval history:  Since his last visit, Jamel Noel reports:  Left Ilioinguinal/iliohypogastric block on 3/24/23 - 90% improvement, noticed 3-4 days after procedure. Ongoing    Left anterior thigh pain has flared up, worse with standing, better with laying down, has these flares when standing still for 10-15 minutes.    Pain scores:  Pain intensity currently is 1 on a scale of 0-10.     Current pain medications include:  Gabapentin 300mg TID     Other treatments have included:  Jamel Noel has not been seen at a pain clinic in the past.    PT: none  Injections: none  Surgery:              Left inguinal and umbilical hernia repair with Dr. Armijo               Kidney stone removal  Alternative: none    Side Effects: no side effect    Medications:  Current Outpatient Medications   Medication Sig Dispense Refill     acetaminophen (TYLENOL) 500 MG tablet [ACETAMINOPHEN (TYLENOL) 500 MG TABLET] Take 1 tablet (500 mg total) by mouth every 4 (four) hours.  0     atenolol (TENORMIN) 25 MG tablet atenolol 25 mg tablet   TAKE 1 TABLET BY MOUTH 1 TIME EACH DAY.       atorvastatin (LIPITOR) 20 MG tablet [ATORVASTATIN (LIPITOR) 20 MG TABLET] Take 20 mg by mouth at bedtime.       docusate sodium (COLACE) 100 MG capsule Take 1 capsule by mouth       gabapentin (NEURONTIN) 300 MG capsule Take 300 mg by mouth 3 times daily       loratadine (CLARITIN) 10 MG tablet Take 1 tablet by mouth       losartan (COZAAR) 100 MG tablet losartan 100 mg tablet   TAKE 1 TABLET BY MOUTH 1 TIME EACH DAY.       omeprazole (PRILOSEC) 20 MG DR capsule Take 1 capsule by mouth       vitamin D3 (CHOLECALCIFEROL) 125 MCG (5000 UT) tablet Take 1 tablet by mouth         Medical History: any changes in medical  history since they were last seen? No    Physical Exam:  Blood pressure 127/82, pulse 71, SpO2 94 %.  Constitutional: Well developed, NAD  Head: normocephalic. Atraumatic.   Eyes: no redness or jaundice noted   CV: warm, well perfused extremities   Skin: no suspicious lesions or rashes   Psychiatric: mentation appears normal and affect full  Focused MSK exam: L anterior thigh sensation is intact to pinprick sensation    Assessment:   1. Post-herniorraphy pain syndrome, left  2. Left meralgia paresthetica    Jamel Noel is a 68 year old male who is seen at the pain clinic for left post-hernorrhaphy pain syndrome. We performed a left ilioinguinal/iliohypogastric block which has provided ongoing 90% pain relief. We will repeat the injection as needed, at least 3 months apart. Today patient describes additional new flaring of pain down the anterior thigh, in a linear distribution vertically. It is not associated with numbness or weakness, and it is worse with prolonged standing. Sensation is intact to pinprick exam. I suspect a possible meralgia paresthetica, but was not demonstrated on sensory exam today. We discussed watchful waiting, to which the patient agreed.    Plan:  1. Physical Therapy:  Continue staying active  2. Diagnostic Studies:  none  3. Medication Management:  Continue gabapentin 300mg TID  4. Further procedures recommended: none for now  1. Ok to repeat the left ilioinguinal/iliohypogastric nerve block at 3 months if pain returns  5. Recommendations to PCP: none  6. Follow up: 3 months    27 minutes spent on the date of encounter doing chart review, history, and exam documentation and further activities as noted above.     Tony Juan MD  Interventional Pain Medicine  [facility]

## 2023-04-18 ENCOUNTER — TELEPHONE (OUTPATIENT)
Dept: CARDIOLOGY | Facility: CLINIC | Age: 68
End: 2023-04-18
Payer: COMMERCIAL

## 2023-04-18 NOTE — TELEPHONE ENCOUNTER
Pre-Procedure Education    Procedure: ILR with Dr Goldsmith on 4-25-23 with arrival time 6:00 am    COVID: COVID policy- if pt develops COVID like symptoms prior to procedure, he/she would need to complete an at home with a rapid antigen COVID test 1-2 days prior to your procedure date. If COVID + pt is aware the procedure will need to be rescheduled, and to contact CV scheduling as soon as possible    Pre-Op H&P: Will need to verify that pt has pre-op scheduled with PMD, as apt/record of pre-op not listed in EPIC- Will request upon completion    Education:   Attempted to contact pt via phone, VM was left with request that pt return call to review above/below information  Pre-Procedure Instruction: NPO after midnight pre procedure, Defined NPO with pt, Remove all jewelry and leave all valuables at home, Shower prior to arrival, Sedation plan/orders, Transportation requirements and arrangements post procedure, Post-procedure follow up process, Post-procedure restrictions/expectations, and Pre-procedure letter sent- letter tab  Risks Reviewed:   Implantable Loop Recorder Insertion    <1% for each of the following:  infection, bleeding, hematoma,      <1% lead fracture or generator  malfunction.    For patients on anticoagulation, the risk of bleeding, hematoma are increased.      Medication:   Instructions regarding anticoagulants: Pt not on AC- N/A  Instructions regarding antiarrhythmic medication: N/A for this type of procedure; N/A  Instructions for medication, other than anticoagulants and antiarrhythmics listed above, given to pt: to take all morning medications with small sips of water, with the exception of OTC supplements and MVI     Important patient information for staff: None    4/18/2023 9:42 AM  Larisa Francois RN

## 2023-04-19 NOTE — TELEPHONE ENCOUNTER
2nd Attempt to contact pt, voicemail message was left with contact information and instructing pt to call back.  4/19/2023 11:04 AM  Kaylie Reid RN

## 2023-04-19 NOTE — TELEPHONE ENCOUNTER
PC back from pt, corresponding information/recommendations reviewed, verbalized understanding, has no further questions at this time and contact information was given for further concerns/questions     Pre-Op H&P: Pt having Pre-op on Friday 4/21/23 at PMD, will request records once complete     4/19/2023 2:20 PM  Kaylie Reid RN

## 2023-04-21 ENCOUNTER — TRANSFERRED RECORDS (OUTPATIENT)
Dept: HEALTH INFORMATION MANAGEMENT | Facility: CLINIC | Age: 68
End: 2023-04-21

## 2023-04-21 ENCOUNTER — LAB REQUISITION (OUTPATIENT)
Dept: LAB | Facility: CLINIC | Age: 68
End: 2023-04-21
Payer: COMMERCIAL

## 2023-04-21 DIAGNOSIS — N18.2 CHRONIC KIDNEY DISEASE, STAGE 2 (MILD): ICD-10-CM

## 2023-04-21 LAB
ANION GAP SERPL CALCULATED.3IONS-SCNC: 15 MMOL/L (ref 7–15)
BUN SERPL-MCNC: 23 MG/DL (ref 8–23)
CALCIUM SERPL-MCNC: 9.9 MG/DL (ref 8.8–10.2)
CHLORIDE SERPL-SCNC: 102 MMOL/L (ref 98–107)
CREAT SERPL-MCNC: 1.08 MG/DL (ref 0.67–1.17)
DEPRECATED HCO3 PLAS-SCNC: 24 MMOL/L (ref 22–29)
GFR SERPL CREATININE-BSD FRML MDRD: 75 ML/MIN/1.73M2
GLUCOSE SERPL-MCNC: 107 MG/DL (ref 70–99)
POTASSIUM SERPL-SCNC: 4.1 MMOL/L (ref 3.4–5.3)
SODIUM SERPL-SCNC: 141 MMOL/L (ref 136–145)

## 2023-04-21 PROCEDURE — 80048 BASIC METABOLIC PNL TOTAL CA: CPT | Mod: ORL | Performed by: FAMILY MEDICINE

## 2023-04-23 ENCOUNTER — HEALTH MAINTENANCE LETTER (OUTPATIENT)
Age: 68
End: 2023-04-23

## 2023-04-25 ENCOUNTER — HOSPITAL ENCOUNTER (OUTPATIENT)
Facility: HOSPITAL | Age: 68
Discharge: HOME | End: 2023-04-25
Attending: INTERNAL MEDICINE | Admitting: NURSE PRACTITIONER
Payer: COMMERCIAL

## 2023-04-25 VITALS
BODY MASS INDEX: 35.79 KG/M2 | WEIGHT: 250 LBS | DIASTOLIC BLOOD PRESSURE: 83 MMHG | HEIGHT: 70 IN | RESPIRATION RATE: 16 BRPM | OXYGEN SATURATION: 94 % | TEMPERATURE: 98.6 F | HEART RATE: 56 BPM | SYSTOLIC BLOOD PRESSURE: 155 MMHG

## 2023-04-25 DIAGNOSIS — R55 SYNCOPE, UNSPECIFIED SYNCOPE TYPE: ICD-10-CM

## 2023-04-25 PROCEDURE — 250N000009 HC RX 250: Performed by: INTERNAL MEDICINE

## 2023-04-25 PROCEDURE — 33285 INSJ SUBQ CAR RHYTHM MNTR: CPT | Performed by: NURSE PRACTITIONER

## 2023-04-25 PROCEDURE — 33285 INSJ SUBQ CAR RHYTHM MNTR: CPT | Performed by: INTERNAL MEDICINE

## 2023-04-25 PROCEDURE — C1764 EVENT RECORDER, CARDIAC: HCPCS | Performed by: INTERNAL MEDICINE

## 2023-04-25 DEVICE — MONITOR CARDIAC LUX DX INSERTABLE M301: Type: IMPLANTABLE DEVICE | Site: CHEST | Status: FUNCTIONAL

## 2023-04-25 RX ORDER — FENTANYL CITRATE 50 UG/ML
25 INJECTION, SOLUTION INTRAMUSCULAR; INTRAVENOUS
Status: DISCONTINUED | OUTPATIENT
Start: 2023-04-25 | End: 2023-04-25 | Stop reason: HOSPADM

## 2023-04-25 RX ORDER — LIDOCAINE 40 MG/G
CREAM TOPICAL
Status: DISCONTINUED | OUTPATIENT
Start: 2023-04-25 | End: 2023-04-25 | Stop reason: HOSPADM

## 2023-04-25 ASSESSMENT — ACTIVITIES OF DAILY LIVING (ADL)
ADLS_ACUITY_SCORE: 35
ADLS_ACUITY_SCORE: 35

## 2023-04-25 NOTE — PRE-PROCEDURE
GENERAL PRE-PROCEDURE:   Procedure:  ILR implant for indication of unexplained syncope  Date/Time:  4/25/2023 6:34 AM    Written consent obtained?: Yes    Risks and benefits: Risks, benefits and alternatives were discussed    Consent given by:  Patient  Patient states understanding of procedure being performed: Yes    Patient's understanding of procedure matches consent: Yes    Procedure consent matches procedure scheduled: Yes    Expected level of sedation:  Moderate  Appropriately NPO:  Yes  ASA Class:  3 (syncope, first and second degree AVB, Obesity, HTN, YESIKA, CKD Stage II, gerd)  Mallampati  :  Grade 4- soft palate obscured by base of tongue  Lungs:  Lungs clear with good breath sounds bilaterally  Heart:  Normal heart sounds and rate  History & Physical reviewed:  History and physical reviewed and no updates needed  Statement of review:  I have reviewed the lab findings, diagnostic data, medications, and the plan for sedation

## 2023-04-25 NOTE — Clinical Note
The ECG shows a sinus bradycardia and an A-V block. The A-V block is 1st degree. ECG rate  = 57 bpm.

## 2023-04-25 NOTE — PROCEDURES
[facility]    Procedure: EP Device    Date/Time: 2023 7:43 AM    Performed by: Maria D Fuentes APRN CNP  Authorized by: Johnson Goldsmith MD      UNIVERSAL PROTOCOL   Site Marked: Yes  Prior Images Obtained and Reviewed:  Yes  Required items: Required blood products, implants, devices and special equipment available    Patient identity confirmed:  Verbally with patient, arm band and provided demographic data  Patient was reevaluated immediately before administering moderate or deep sedation or anesthesia  Confirmation Checklist:  Patient's identity using two indicators, relevant allergies, procedure was appropriate and matched the consent or emergent situation and correct equipment/implants were available  Time out: Immediately prior to the procedure a time out was called    Universal Protocol: the Joint Commission Universal Protocol was followed    Preparation: Patient was prepped and draped in usual sterile fashion       ANESTHESIA    Anesthesia: Local infiltration  Local Anesthetic:  Lidocaine 1% without epinephrine      SEDATION    Patient Sedated: No      PROCEDURE  Describe Procedure: [facility]  Cardiac Electrophysiology  [address]  [place] [address]   Office: [phone]  Fax: [phone]      [facility]  Cardiac Electrophysiology    Cardiac Electrophysiology - Procedure note: Loop recorder implantation      Patient: Jamel Noel   : 1955     : Maria D Fuentes CNP under supervision of Dr. Johnson Goldsmith  Date: 2023    Procedures performed:  1. ILR implantation    Recommendations:  1. Transfer to [facility] [place] care  2. Anticipate discharge this morning if site check nominal with overall clinical stability  3. Clinic visit in approximately 7-14 days  4. Follow-up clinic visit in 3-4 months    Indications:  Recurrent syncope    Patient profile:  Cash Noel is a 67-year-old gentleman with a history  of recurrent syncope, hypertension, hyperlipidemia, and YESIKA on CPAP.  He has a remote history of childhood syncopal events prior to middle school.  More recently, he has had 10 episodes of passing out while on airplane flights despite trying to hydrate prior to flights and moving around more during the flights.  For a few seconds prior to passing out, he sees flashing lights and gets cold and clammy, but onset is very quick.  No seizure-like symptoms.  Work-up has been thus far unremarkable.    Procedure note:  Jamel Noel presented in a post absorptive state to [facility] in sinus rhythm.  After verification of informed consent and time-out procedure, the anterior chest was prepped and draped in the typical sterile manner.     The left upper parasternal region was anesthetized with lidocaine.  The incision tool was utilized to creat a 1cm horizontal incision, and the ILR implanted using the included implantation tool.  The overlying skin was closed with steri-strips after hemostasis ensured.  The procedure was well tolerated and there were no complications.      Device implant details:  ILR - [employer] LUX-[id number], SN [id number]      Maria D Fuentes CNP  4/25/2023  7:44 AM  Patient Tolerance:  Patient tolerated the procedure well with no immediate complications  Length of time physician/provider present for 1:1 monitoring during sedation: 10

## 2023-04-25 NOTE — PLAN OF CARE
Pt alert and oriented. Sinus suzan on monitor. Pt prepped and ready for loop recorder implant.

## 2023-04-25 NOTE — DISCHARGE INSTRUCTIONS
[facility]  Cardiac Electrophysiology  [address]  [place] [address]   Office: [phone]  Fax: [phone]     Cardiac Electrophysiology - Loop Recorder Implantation Discharge Instructions      PROCEDURE   ILR (implantable loop recorder) placement         MEDICATION INSTRUCTIONS   Continue taking your current medications         WOUND CARE   Remove pressure dressing in 3 hours.  Leave the large overlying dressing in place until 2 days after discharge - this dressing can thereafter be removed by gently pulling off.  Underneath the large dressing will be steri-strips. DO NOT REMOVE these strips; please leave these in place until you are seen in clinic.  DO NOT COVER the site with any bandages or dressings. The site should be kept dry for 3-5 days - please avoid traditional showers or baths during this time.  Keep the site clean and dry.  No swimming, sauna, or hot tub use until incision is completely healed.         ACTIVITY   It takes approximately 1-2 weeks for your incision to heal.  During this time use extra precautions - please avoid the following:  Raising your arm over your head or stretching behind your back (no hyperflexion)  Pushing or pulling (mowing the lawn, vacuuming)  Lifting anything heavier than 10 pounds or a gallon of milk  Sudden or extreme motions  Be physically active every day.  Moving your arm is important         REMOTE MONITORING   You will receive a remote transmission station to monitor your device from home  Please set the transmitter up as soon as you get home from the hospital.  Directions are included in the box, and you may call our office or the company technical support line if you need assistance connecting your transmitter.  Please send a transmission the day after you go home  Automated transmissions will be sent every 3 months or sooner if device issues are detected.  You may manually send in transmissions if you are having  arrhythmia symptoms or think there may be a problem with your device.  Please call our office at [phone] if you send in a transmission off-schedule         WHAT TO WATCH OUT FOR   Contact our office or seek emergency care for any of the following:  Drainage, bleeding or oozing from the site  Redness, increased swelling, or warmth around the device site  Pain not treated with prescribed pain medication  Temperature greater than 100.4F  Chest pain, shortness of breath, dizziness/fainting         FOLLOW-UP   Our office will coordinate a follow-up visit visit in clinic for a device interrogation and an incision check 7-14 days after your procedure.   Please contact us at [phone] any issues during your recovery.

## 2023-04-25 NOTE — INTERVAL H&P NOTE
"I have reviewed the surgical (or preoperative) H&P that is linked to this encounter, and examined the patient. There are no significant changes    Clinical Conditions Present on Arrival:  Clinically Significant Risk Factors Present on Admission                  # Obesity: Estimated body mass index is 35.87 kg/m  as calculated from the following:    Height as of this encounter: 1.778 m (5' 10\").    Weight as of this encounter: 113.4 kg (250 lb).       "

## 2023-04-25 NOTE — PLAN OF CARE
Pt tolerated procedure well. Alert and oriented. Discharge education given to patient and patient wife. Patient ready to be discharged to home with wife. All belongings remain with patient.

## 2023-05-01 ENCOUNTER — ANCILLARY PROCEDURE (OUTPATIENT)
Dept: CARDIOLOGY | Facility: CLINIC | Age: 68
End: 2023-05-01
Attending: INTERNAL MEDICINE
Payer: COMMERCIAL

## 2023-05-01 DIAGNOSIS — R55 SYNCOPE, UNSPECIFIED SYNCOPE TYPE: Primary | ICD-10-CM

## 2023-05-01 DIAGNOSIS — Z95.818 IMPLANTABLE LOOP RECORDER PRESENT: ICD-10-CM

## 2023-05-01 PROCEDURE — 93291 INTERROG DEV EVAL SCRMS IP: CPT | Performed by: INTERNAL MEDICINE

## 2023-05-01 NOTE — DISCHARGE INSTRUCTIONS
Implantable Loop Recorder (ILR) Post-operative Checklist    The Device Registered Nurse (RN) reviewed the device function.    The Device RN did a wound assessment and wound care teaching.    Please call the Device Nurses with any signs of infection or questions regarding wound healing. Device Nurse Line: [phone], Option #3.    The Device RN demonstrated and displayed the specific remote monitoring system for your device.    The Device RN reviewed the Partnership Agreement Form.    Patient Instructions  You were provided with a device identification (ID) in the hospital. Please carry it with you.     You may travel by any mode of transportation; just show your device ID card.     For any surgery, let your doctor know you have an implantable loop recorder.     Your device is MRI safe     Please call the Device Clinic after each time you use your Patient Symptom Activator.           Device Clinic Contact Information  Device Nurses: [phone], Option #3.  Device Remote Specialists: [phone], Option #2. For questions about your Remote Transmission or Transmission Schedule  Device Schedulers: [phone], Option #1

## 2023-05-04 LAB
MDC_IDC_PG_IMPLANT_DTM: NORMAL
MDC_IDC_PG_MFG: NORMAL
MDC_IDC_PG_MODEL: NORMAL
MDC_IDC_PG_SERIAL: NORMAL
MDC_IDC_PG_TYPE: NORMAL
MDC_IDC_SESS_CLINIC_NAME: NORMAL
MDC_IDC_SESS_DTM: NORMAL
MDC_IDC_SESS_TYPE: NORMAL

## 2023-05-30 ENCOUNTER — TELEPHONE (OUTPATIENT)
Dept: CARDIOLOGY | Facility: CLINIC | Age: 68
End: 2023-05-30
Payer: COMMERCIAL

## 2023-05-30 DIAGNOSIS — Z95.818 IMPLANTABLE LOOP RECORDER PRESENT: Primary | ICD-10-CM

## 2023-05-30 NOTE — TELEPHONE ENCOUNTER
"----- Message from Valerie BUTTERFIELD Jontahan sent at 5/30/2023  9:10 AM CDT -----  Regarding: device RN review  No charge. No Epic interface required.  Type: alert remote loop recorder transmission for symptom episode  Presenting rhythm: Sinus 80's.  Battery status: OK.  Arrhythmias: since 5/1/23; One symptom triggered episode on 5/27/23 5:33PM, appears to be normal sinus ~70's.  Comments: Normal loop recorder function. Routed to device RN for review. E. Jonathan, Device Specialist    Transmission reviewed, agree with above. Symptom episode from 5/27/23 shows NSR, no significant arrhythmias noted. Call placed to patient to review findings.     Patient states on Friday he \"just wasn't feeling good all day. I was short of breath and lightheaded on/off all day. Finally around suppertime I decided to record something with device to see if something would show up.\" He also reports that his BP has been elevated ranging from 150-168/80-88 over the weekend. States the shortness of breath and lightheadedness is better but \"still not totally gone.\" Denies any syncope, chest pain, or fluid retention. Confirms Atenolol 25 mg daily and Cozaar 100 mg daily, has not missed any doses.     Will update Dr. Francois for any recommendations re: symptoms/BPs  Elen Narvaez RN    "

## 2023-05-31 NOTE — PROGRESS NOTES
Spoke with Pt regarding request from Dr. Francois. Pt indicated feeling a bit better today but still having symptoms of generalized feelings of being unwell. lasted from Friday -Sunday. Will schedule follow up with pcp. Pt requested follow up with Dr. Francois.  Message sent to scheduling as Pt is currently at work. -ELLIOTT

## 2023-05-31 NOTE — PROGRESS NOTES
From: Omid Francois MD  Sent: 5/31/2023   7:17 AM CDT  To: Tracey Parra    He should follow-up with his primary care physician to see if anything else is going on.    Thanks,    Omid

## 2023-07-20 ENCOUNTER — OFFICE VISIT (OUTPATIENT)
Dept: PALLIATIVE MEDICINE | Facility: OTHER | Age: 68
End: 2023-07-20
Attending: STUDENT IN AN ORGANIZED HEALTH CARE EDUCATION/TRAINING PROGRAM
Payer: COMMERCIAL

## 2023-07-20 ENCOUNTER — PREP FOR PROCEDURE (OUTPATIENT)
Dept: PALLIATIVE MEDICINE | Facility: OTHER | Age: 68
End: 2023-07-20

## 2023-07-20 ENCOUNTER — OFFICE VISIT (OUTPATIENT)
Dept: CARDIOLOGY | Facility: CLINIC | Age: 68
End: 2023-07-20
Payer: COMMERCIAL

## 2023-07-20 ENCOUNTER — TELEPHONE (OUTPATIENT)
Dept: PALLIATIVE MEDICINE | Facility: OTHER | Age: 68
End: 2023-07-20

## 2023-07-20 VITALS
SYSTOLIC BLOOD PRESSURE: 149 MMHG | WEIGHT: 258.4 LBS | OXYGEN SATURATION: 94 % | HEART RATE: 65 BPM | DIASTOLIC BLOOD PRESSURE: 87 MMHG | HEIGHT: 70 IN | BODY MASS INDEX: 36.99 KG/M2 | RESPIRATION RATE: 16 BRPM

## 2023-07-20 VITALS
OXYGEN SATURATION: 98 % | BODY MASS INDEX: 36.88 KG/M2 | SYSTOLIC BLOOD PRESSURE: 140 MMHG | WEIGHT: 257 LBS | DIASTOLIC BLOOD PRESSURE: 81 MMHG | HEART RATE: 65 BPM

## 2023-07-20 DIAGNOSIS — Z98.890 HISTORY OF HERNIA REPAIR: ICD-10-CM

## 2023-07-20 DIAGNOSIS — R55 SYNCOPE, UNSPECIFIED SYNCOPE TYPE: ICD-10-CM

## 2023-07-20 DIAGNOSIS — Z95.818 IMPLANTABLE LOOP RECORDER PRESENT: ICD-10-CM

## 2023-07-20 DIAGNOSIS — Z87.19 HISTORY OF HERNIA REPAIR: ICD-10-CM

## 2023-07-20 DIAGNOSIS — G57.12 MERALGIA PARESTHETICA, LEFT: Primary | ICD-10-CM

## 2023-07-20 DIAGNOSIS — R53.82 CHRONIC FATIGUE: ICD-10-CM

## 2023-07-20 LAB — TSH SERPL DL<=0.005 MIU/L-ACNC: 1.82 UIU/ML (ref 0.3–4.2)

## 2023-07-20 PROCEDURE — G0463 HOSPITAL OUTPT CLINIC VISIT: HCPCS | Performed by: STUDENT IN AN ORGANIZED HEALTH CARE EDUCATION/TRAINING PROGRAM

## 2023-07-20 PROCEDURE — 99214 OFFICE O/P EST MOD 30 MIN: CPT | Performed by: STUDENT IN AN ORGANIZED HEALTH CARE EDUCATION/TRAINING PROGRAM

## 2023-07-20 PROCEDURE — 99214 OFFICE O/P EST MOD 30 MIN: CPT | Performed by: INTERNAL MEDICINE

## 2023-07-20 PROCEDURE — 84443 ASSAY THYROID STIM HORMONE: CPT | Performed by: INTERNAL MEDICINE

## 2023-07-20 PROCEDURE — 36415 COLL VENOUS BLD VENIPUNCTURE: CPT | Performed by: INTERNAL MEDICINE

## 2023-07-20 RX ORDER — MULTIVIT WITH MINERALS/LUTEIN
TABLET ORAL
COMMUNITY
Start: 2022-04-18

## 2023-07-20 RX ORDER — CEPHALEXIN 250 MG/1
TABLET ORAL 2 TIMES DAILY
COMMUNITY
Start: 2023-01-13 | End: 2024-01-31

## 2023-07-20 RX ORDER — AMLODIPINE BESYLATE 10 MG/1
10 TABLET ORAL EVERY MORNING
COMMUNITY
Start: 2022-04-19 | End: 2024-01-31

## 2023-07-20 ASSESSMENT — PAIN SCALES - GENERAL: PAINLEVEL: NO PAIN (0)

## 2023-07-20 NOTE — PROGRESS NOTES
"    [facility]  [phone]          Assessment/Recommendations   Patient with known autonomic dysfunction with recurrent syncopal episodes particularly on airplane flights.  He has not been on a flight lately but does have a implantable monitor and so we will see when he takes flights later this year if there are any rhythm disturbances.    He does feel more fatigued and is having a difficult time getting back to where he wanted to be from a walking standpoint.  I have pushed him to walk more and try to push through it if the pain in his left leg will allow.  We will also check a TSH today.    Blood pressure is a bit elevated and he will collect 10 blood pressures at home.  He feels they are better at home and will document them and he will send them into us in the next couple of weeks.       History of Present Illness/Subjective    Mr. Jamel Noel is a 68 year old male with known autonomic dysfunction with syncopal episodes but a question of possible rhythm disturbances.  He has an implantable monitor.  He has not had any syncopal episodes but is not taking any airplane flights where they typically would occur.  He has not had any chest pain.  Reports that his breathing and sleeping are a bit better after starting CPAP.  He still does not feel refreshed in the morning.  He does do some exercise with walking 1 mile 3 times a week and walking 2 miles a 3 times a week.  He just feels a little fatigued when he walks and has been having some pain in his left leg which limits his walking as well.    ECG: Personally reviewed.        Physical Examination Review of Systems   BP (!) 149/87 (BP Location: Left arm, Patient Position: Sitting, Cuff Size: Adult Large)   Pulse 65   Resp 16   Ht 1.778 m (5' 10\")   Wt 117.2 kg (258 lb 6.4 oz)   SpO2 94%   BMI 37.08 kg/m    Body mass index is 37.08 kg/m .  Wt Readings from Last 3 Encounters:   07/20/23 117.2 kg (258 lb 6.4 oz)   04/25/23 113.4 kg (250 " "lb)   03/08/23 116.6 kg (257 lb)     General Appearance:   Alert, cooperative and in no acute distress.   ENT/Mouth: Pink/moist oral mucosa   EYES:  no scleral icterus, normal conjunctivae   Neck: JVP normal. No Hepatojugular reflux. Thyroid not visualized.   Chest/Lungs:   Lungs are clear to auscultation, equal chest wall expansion.   Cardiovascular:   S1, S2 without murmur ,clicks or rubs. Brachial, radial and posterior tibial pulses are intact and symetric. No carotid bruits noted   Abdomen:  Nontender. BS+.  Rounded   Extremities: No cyanosis, clubbing and trace pretibial edema   Skin: no xanthelasma, warm.    Neurologic: normal arm movement bilateral, no tremors     Psychiatric: Appropriate affect.      Enc Vitals  BP: (!) 149/87  Pulse: 65  Resp: 16  SpO2: 94 %  Weight: 117.2 kg (258 lb 6.4 oz)  Height: 177.8 cm (5' 10\")                                           Medical History  Surgical History Family History Social History   Past Medical History:   Diagnosis Date     Chronic sinusitis      GERD (gastroesophageal reflux disease)      Hyperlipemia      Hypertension      Lumbar spondylolysis      Peripheral neuropathy      Renal disease      Syncope, unspecified syncope type 7/20/2023    Past Surgical History:   Procedure Laterality Date     COLONOSCOPY       EP LOOP RECORDER IMPLANT N/A 4/25/2023    Procedure: Loop Recorder Implant;  Surgeon: Johnson Goldsmith MD;  Location: [facility] CV     HERNIORRHAPHY UMBILICAL N/A 9/16/2022    Procedure: HERNIORRHAPHY, UMBILICAL, OPEN;  Surgeon: Cooper Armijo MD;  Location: [facility] OR     INJECT NERVE BLOCK ILIOINGUINAL Left 3/24/2023    Procedure: INJECT NERVE BLOCK ILIOINGUINAL;  Surgeon: Tony Juan MD;  Location: [facility] OR     LAPAROSCOPIC HERNIORRHAPHY INGUINAL Left 9/16/2022    Procedure: HERNIORRHAPHY, INGUINAL, LAPAROSCOPIC, HERNIORRHAPHY, UMBILICAL, OPEN;  Surgeon: Cooper Armijo MD;  Location: [facility] OR     OTHER SURGICAL HISTORY "      vastectomy     OTHER SURGICAL HISTORY Bilateral 2007    carpal tunnel repair    No family history on file. Social History     Socioeconomic History     Marital status:      Spouse name: Not on file     Number of children: Not on file     Years of education: Not on file     Highest education level: Not on file   Occupational History     Not on file   Tobacco Use     Smoking status: Never     Smokeless tobacco: Never   Vaping Use     Vaping Use: Never used   Substance and Sexual Activity     Alcohol use: Not Currently     Comment: Alcoholic Drinks/day: no drinking for 5 years     Drug use: Never     Sexual activity: Not Currently   Other Topics Concern     Not on file   Social History Narrative     Not on file     Social Determinants of Health     Financial Resource Strain: Not on file   Food Insecurity: Not on file   Transportation Needs: Not on file   Physical Activity: Not on file   Stress: Not on file   Social Connections: Not on file   Intimate Partner Violence: Not on file   Housing Stability: Not on file          Medications  Allergies   Current Outpatient Medications   Medication Sig Dispense Refill     acetaminophen (TYLENOL) 500 MG tablet [ACETAMINOPHEN (TYLENOL) 500 MG TABLET] Take 1 tablet (500 mg total) by mouth every 4 (four) hours.  0     amLODIPine (NORVASC) 10 MG tablet Take 10 mg by mouth every morning       atenolol (TENORMIN) 25 MG tablet atenolol 25 mg tablet   TAKE 1 TABLET BY MOUTH 1 TIME EACH DAY.       atorvastatin (LIPITOR) 20 MG tablet [ATORVASTATIN (LIPITOR) 20 MG TABLET] Take 20 mg by mouth at bedtime.       cephalexin 250 MG TABS Take by mouth 2 times daily       docusate sodium (COLACE) 100 MG capsule Take 1 capsule by mouth       gabapentin (NEURONTIN) 300 MG capsule Take 300 mg by mouth 3 times daily       loratadine (CLARITIN) 10 MG tablet Take 1 tablet by mouth       losartan (COZAAR) 100 MG tablet losartan 100 mg tablet   TAKE 1 TABLET BY MOUTH 1 TIME EACH DAY.        multivitamin (CENTRUM SILVER) tablet        omeprazole (PRILOSEC) 20 MG DR capsule Take 1 capsule by mouth       vitamin B complex with vitamin C (VITAMIN  B COMPLEX) tablet        vitamin D3 (CHOLECALCIFEROL) 125 MCG (5000 UT) tablet Take 1 tablet by mouth      Allergies   Allergen Reactions     Banana Itching     Itching throat     Nuts - Unspecified [Nuts] Itching     Itching throat     Grass      Other Environmental Allergy Other (See Comments)     Grass Hayfever  Itching throat runny eyes         Lab Results    Chemistry/lipid CBC Cardiac Enzymes/BNP/TSH/INR   Lab Results   Component Value Date    CHOL 124 08/05/2022    HDL 31 (L) 08/05/2022    TRIG 178 (H) 08/05/2022    BUN 23.0 04/21/2023     04/21/2023    CO2 24 04/21/2023    No results found for: WBC, HGB, HCT, MCV, PLT Lab Results   Component Value Date    TSH 3.17 04/13/2022

## 2023-07-20 NOTE — PROGRESS NOTES
[facility] Follow-up    Date of visit: 7/20/2023    Chief complaint:   Chief Complaint   Patient presents with     Pain     Pain Management     Injection site pain better 0,  left front thigh increased 4 now , 7 at worse.       Interval history:  Since his last visit, Jamel Noel reports:  No pain in the groin or abdomen on the left ever since our ilioinguinal/iliohypogastric nerve block.  Has worsening left anterolateral thigh pain    Pain scores:  Pain intensity currently is 4 on a scale of 0-10.     Current pain medications include:  Gabapentin 300mg TID     Other treatments have included:  Jamel Noel has not been seen at a pain clinic in the past.    PT: none  Injections: none  Surgery:              Left inguinal and umbilical hernia repair with Dr. Armijo               Kidney stone removal  Alternative: none    Side Effects: no side effect    Medications:  Current Outpatient Medications   Medication Sig Dispense Refill     acetaminophen (TYLENOL) 500 MG tablet [ACETAMINOPHEN (TYLENOL) 500 MG TABLET] Take 1 tablet (500 mg total) by mouth every 4 (four) hours.  0     amLODIPine (NORVASC) 10 MG tablet Take 10 mg by mouth every morning       atenolol (TENORMIN) 25 MG tablet atenolol 25 mg tablet   TAKE 1 TABLET BY MOUTH 1 TIME EACH DAY.       atorvastatin (LIPITOR) 20 MG tablet [ATORVASTATIN (LIPITOR) 20 MG TABLET] Take 20 mg by mouth at bedtime.       cephalexin 250 MG TABS Take by mouth 2 times daily       docusate sodium (COLACE) 100 MG capsule Take 1 capsule by mouth       gabapentin (NEURONTIN) 300 MG capsule Take 300 mg by mouth 3 times daily       loratadine (CLARITIN) 10 MG tablet Take 1 tablet by mouth       losartan (COZAAR) 100 MG tablet losartan 100 mg tablet   TAKE 1 TABLET BY MOUTH 1 TIME EACH DAY.       multivitamin (CENTRUM SILVER) tablet        omeprazole (PRILOSEC) 20 MG DR capsule Take 1 capsule by mouth       vitamin B complex  with vitamin C (VITAMIN  B COMPLEX) tablet        vitamin D3 (CHOLECALCIFEROL) 125 MCG (5000 UT) tablet Take 1 tablet by mouth         Medical History: any changes in medical history since they were last seen? No    Physical Exam:  Blood pressure (!) 140/81, pulse 65, weight 116.6 kg (257 lb), SpO2 98 %.  Constitutional: Well developed, NAD  Head: normocephalic. Atraumatic.   Eyes: no redness or jaundice noted   CV: warm, well perfused extremities   Skin: no suspicious lesions or rashes   Psychiatric: mentation appears normal and affect full    Assessment:   1. Post-herniorraphy pain syndrome, left  2. Left meralgia paresthetica     Jamel Noel is a 68 year old male who is seen at the pain clinic for left post-hernorrhaphy pain syndrome and left meralgia paresthetica. We performed a left ilioinguinal/iliohypogastric block which has provided 100% pain relief, ongoing. He notes today that his left anterolateral thigh pain has worsened and he would like to proceed interventionally. We previously diagnosed this as meralgia paresthetica based on the distribution of pain. We will proceed with [place] [facility] [facility] We will see him back in 3 months.     Plan:  1. Physical Therapy:  Continue staying active  2. Diagnostic Studies:  none  3. Medication Management:  Continue gabapentin 300mg TID  4. Further procedures recommended: none for now  1. Placed case request for left lateral femoral cutaneous nerve block  2. Ok to repeat the left ilioinguinal/iliohypogastric nerve block if pain returns  5. Recommendations to PCP: none  6. Follow up: 3 months     34 minutes spent on the date of encounter doing chart review, history, and exam documentation and further activities as noted above.     Tony Juan MD  Interventional Pain Medicine  [facility]

## 2023-07-20 NOTE — PROGRESS NOTES
Patient presents to the clinic today for a visit  with Tony Juan MD            2/28/2023    10:57 AM 7/20/2023     1:55 PM 7/20/2023     1:57 PM   PEG Score   PEG Total Score 2.67 0 0       UDS/CSA-    Medications-    QUESTIONS:    Georgette Hollis MA  [facility] [place]

## 2023-07-20 NOTE — LETTER
7/20/2023    Yvan Vergara MD  [address]  [place] [address]    RE: Jamel Noel       Dear Colleague,     I had the pleasure of seeing Jamel Noel in the [facility].      [facility]  [phone]          Assessment/Recommendations   Patient with known autonomic dysfunction with recurrent syncopal episodes particularly on airplane flights.  He has not been on a flight lately but does have a implantable monitor and so we will see when he takes flights later this year if there are any rhythm disturbances.    He does feel more fatigued and is having a difficult time getting back to where he wanted to be from a walking standpoint.  I have pushed him to walk more and try to push through it if the pain in his left leg will allow.  We will also check a TSH today.    Blood pressure is a bit elevated and he will collect 10 blood pressures at home.  He feels they are better at home and will document them and he will send them into us in the next couple of weeks.       History of Present Illness/Subjective    Mr. Jamel Noel is a 68 year old male with known autonomic dysfunction with syncopal episodes but a question of possible rhythm disturbances.  He has an implantable monitor.  He has not had any syncopal episodes but is not taking any airplane flights where they typically would occur.  He has not had any chest pain.  Reports that his breathing and sleeping are a bit better after starting CPAP.  He still does not feel refreshed in the morning.  He does do some exercise with walking 1 mile 3 times a week and walking 2 miles a 3 times a week.  He just feels a little fatigued when he walks and has been having some pain in his left leg which limits his walking as well.    ECG: Personally reviewed.        Physical Examination Review of Systems   BP (!) 149/87 (BP Location: Left arm, Patient Position: Sitting, Cuff Size: Adult Large)   Pulse 65   Resp 16   Ht  "1.778 m (5' 10\")   Wt 117.2 kg (258 lb 6.4 oz)   SpO2 94%   BMI 37.08 kg/m    Body mass index is 37.08 kg/m .  Wt Readings from Last 3 Encounters:   07/20/23 117.2 kg (258 lb 6.4 oz)   04/25/23 113.4 kg (250 lb)   03/08/23 116.6 kg (257 lb)     General Appearance:   Alert, cooperative and in no acute distress.   ENT/Mouth: Pink/moist oral mucosa   EYES:  no scleral icterus, normal conjunctivae   Neck: JVP normal. No Hepatojugular reflux. Thyroid not visualized.   Chest/Lungs:   Lungs are clear to auscultation, equal chest wall expansion.   Cardiovascular:   S1, S2 without murmur ,clicks or rubs. Brachial, radial and posterior tibial pulses are intact and symetric. No carotid bruits noted   Abdomen:  Nontender. BS+.  Rounded   Extremities: No cyanosis, clubbing and trace pretibial edema   Skin: no xanthelasma, warm.    Neurologic: normal arm movement bilateral, no tremors     Psychiatric: Appropriate affect.      Enc Vitals  BP: (!) 149/87  Pulse: 65  Resp: 16  SpO2: 94 %  Weight: 117.2 kg (258 lb 6.4 oz)  Height: 177.8 cm (5' 10\")                                           Medical History  Surgical History Family History Social History   Past Medical History:   Diagnosis Date    Chronic sinusitis     GERD (gastroesophageal reflux disease)     Hyperlipemia     Hypertension     Lumbar spondylolysis     Peripheral neuropathy     Renal disease     Syncope, unspecified syncope type 7/20/2023    Past Surgical History:   Procedure Laterality Date    COLONOSCOPY      EP LOOP RECORDER IMPLANT N/A 4/25/2023    Procedure: Loop Recorder Implant;  Surgeon: Johnson Goldsmith MD;  Location: [facility] LAB CV    HERNIORRHAPHY UMBILICAL N/A 9/16/2022    Procedure: HERNIORRHAPHY, UMBILICAL, OPEN;  Surgeon: Cooper Armijo MD;  Location: [facility] OR    INJECT NERVE BLOCK ILIOINGUINAL Left 3/24/2023    Procedure: INJECT NERVE BLOCK ILIOINGUINAL;  Surgeon: Tony Juan MD;  Location: [facility] OR    LAPAROSCOPIC HERNIORRHAPHY " INGUINAL Left 9/16/2022    Procedure: HERNIORRHAPHY, INGUINAL, LAPAROSCOPIC, HERNIORRHAPHY, UMBILICAL, OPEN;  Surgeon: Cooper Armijo MD;  Location: [facility]    OTHER SURGICAL HISTORY      vastectomy    OTHER SURGICAL HISTORY Bilateral 2007    carpal tunnel repair    No family history on file. Social History     Socioeconomic History    Marital status:      Spouse name: Not on file    Number of children: Not on file    Years of education: Not on file    Highest education level: Not on file   Occupational History    Not on file   Tobacco Use    Smoking status: Never    Smokeless tobacco: Never   Vaping Use    Vaping Use: Never used   Substance and Sexual Activity    Alcohol use: Not Currently     Comment: Alcoholic Drinks/day: no drinking for 5 years    Drug use: Never    Sexual activity: Not Currently   Other Topics Concern    Not on file   Social History Narrative    Not on file     Social Determinants of Health     Financial Resource Strain: Not on file   Food Insecurity: Not on file   Transportation Needs: Not on file   Physical Activity: Not on file   Stress: Not on file   Social Connections: Not on file   Intimate Partner Violence: Not on file   Housing Stability: Not on file          Medications  Allergies   Current Outpatient Medications   Medication Sig Dispense Refill    acetaminophen (TYLENOL) 500 MG tablet [ACETAMINOPHEN (TYLENOL) 500 MG TABLET] Take 1 tablet (500 mg total) by mouth every 4 (four) hours.  0    amLODIPine (NORVASC) 10 MG tablet Take 10 mg by mouth every morning      atenolol (TENORMIN) 25 MG tablet atenolol 25 mg tablet   TAKE 1 TABLET BY MOUTH 1 TIME EACH DAY.      atorvastatin (LIPITOR) 20 MG tablet [ATORVASTATIN (LIPITOR) 20 MG TABLET] Take 20 mg by mouth at bedtime.      cephalexin 250 MG TABS Take by mouth 2 times daily      docusate sodium (COLACE) 100 MG capsule Take 1 capsule by mouth      gabapentin (NEURONTIN) 300 MG capsule Take 300 mg by mouth 3 times daily       loratadine (CLARITIN) 10 MG tablet Take 1 tablet by mouth      losartan (COZAAR) 100 MG tablet losartan 100 mg tablet   TAKE 1 TABLET BY MOUTH 1 TIME EACH DAY.      multivitamin (CENTRUM SILVER) tablet       omeprazole (PRILOSEC) 20 MG DR capsule Take 1 capsule by mouth      vitamin B complex with vitamin C (VITAMIN  B COMPLEX) tablet       vitamin D3 (CHOLECALCIFEROL) 125 MCG (5000 UT) tablet Take 1 tablet by mouth      Allergies   Allergen Reactions    Banana Itching     Itching throat    Nuts - Unspecified [Nuts] Itching     Itching throat    Grass     Other Environmental Allergy Other (See Comments)     Grass Hayfever  Itching throat runny eyes         Lab Results    Chemistry/lipid CBC Cardiac Enzymes/BNP/TSH/INR   Lab Results   Component Value Date    CHOL 124 08/05/2022    HDL 31 (L) 08/05/2022    TRIG 178 (H) 08/05/2022    BUN 23.0 04/21/2023     04/21/2023    CO2 24 04/21/2023    No results found for: WBC, HGB, HCT, MCV, PLT Lab Results   Component Value Date    TSH 3.17 04/13/2022                                               Thank you for allowing me to participate in the care of your patient.      Sincerely,     Omid Francois MD     [facility]  cc:   Omid Francois MD  [address]  [place] [address]

## 2023-07-20 NOTE — PATIENT INSTRUCTIONS
Physical Therapy:  Continue staying active  Diagnostic Studies:  none  Medication Management:  Continue gabapentin 300mg TID  Further procedures recommended: none for now  Placed case request for left lateral femoral cutaneous nerve block  Ok to repeat the left ilioinguinal/iliohypogastric nerve block if pain returns  Recommendations to PCP: none  Follow up: 3 months      [facility] Number:  [phone]  Call with any questions about your care and for scheduling assistance.   Calls are returned Monday through Friday between 8 AM and 4:30 PM. We usually get back to you within 2 business days depending on the issue/request.    If we are prescribing your medications:  For opioid medication refills, call the clinic or send a Tattoodo message 7 days in advance.  Please include:  Name of requested medication  Name of the pharmacy.  For non-opioid medications, call your pharmacy directly to request a refill. Please allow 3-4 days to be processed.   Per [place] State Law:  All controlled substance prescriptions must be filled within 30 days of being written.    For those controlled substances allowing refills, pickup must occur within 30 days of last fill.      We believe regular attendance is key to your success in our program!    Any time you are unable to keep your appointment we ask that you call us at least 24 hours in advance to cancel.This will allow us to offer the appointment time to another patient.   Multiple missed appointments may lead to dismissal from the clinic.

## 2023-07-20 NOTE — TELEPHONE ENCOUNTER
Patient is scheduled for surgery with Dr. Juan    Spoke with: patient    Date of Surgery: 08/04/23    Location: [facility]    Informed patient they will need an adult  yes    Additional comments: Patient is aware       Jenny Hudson MA on 7/20/2023 at 3:24 PM

## 2023-08-04 ENCOUNTER — ANCILLARY ORDERS (OUTPATIENT)
Dept: PALLIATIVE MEDICINE | Facility: OTHER | Age: 68
End: 2023-08-04

## 2023-08-04 ENCOUNTER — ANCILLARY PROCEDURE (OUTPATIENT)
Dept: RADIOLOGY | Facility: AMBULATORY SURGERY CENTER | Age: 68
End: 2023-08-04
Attending: STUDENT IN AN ORGANIZED HEALTH CARE EDUCATION/TRAINING PROGRAM
Payer: COMMERCIAL

## 2023-08-04 ENCOUNTER — HOSPITAL ENCOUNTER (OUTPATIENT)
Facility: AMBULATORY SURGERY CENTER | Age: 68
Discharge: HOME | End: 2023-08-04
Attending: STUDENT IN AN ORGANIZED HEALTH CARE EDUCATION/TRAINING PROGRAM | Admitting: STUDENT IN AN ORGANIZED HEALTH CARE EDUCATION/TRAINING PROGRAM
Payer: COMMERCIAL

## 2023-08-04 VITALS
HEART RATE: 61 BPM | RESPIRATION RATE: 16 BRPM | HEIGHT: 70 IN | BODY MASS INDEX: 35.79 KG/M2 | OXYGEN SATURATION: 94 % | SYSTOLIC BLOOD PRESSURE: 152 MMHG | DIASTOLIC BLOOD PRESSURE: 89 MMHG | WEIGHT: 250 LBS

## 2023-08-04 DIAGNOSIS — R52 PAIN: Primary | ICD-10-CM

## 2023-08-04 DIAGNOSIS — R52 PAIN: ICD-10-CM

## 2023-08-04 PROCEDURE — 64450 NJX AA&/STRD OTHER PN/BRANCH: CPT | Mod: LT | Performed by: STUDENT IN AN ORGANIZED HEALTH CARE EDUCATION/TRAINING PROGRAM

## 2023-08-04 PROCEDURE — 64450 NJX AA&/STRD OTHER PN/BRANCH: CPT | Mod: LT

## 2023-08-04 PROCEDURE — 76942 ECHO GUIDE FOR BIOPSY: CPT | Mod: 26 | Performed by: STUDENT IN AN ORGANIZED HEALTH CARE EDUCATION/TRAINING PROGRAM

## 2023-08-04 RX ORDER — LIDOCAINE HYDROCHLORIDE 10 MG/ML
INJECTION, SOLUTION EPIDURAL; INFILTRATION; INTRACAUDAL; PERINEURAL PRN
Status: DISCONTINUED | OUTPATIENT
Start: 2023-08-04 | End: 2023-08-04 | Stop reason: HOSPADM

## 2023-08-04 RX ORDER — ROPIVACAINE HYDROCHLORIDE 5 MG/ML
INJECTION, SOLUTION EPIDURAL; INFILTRATION; PERINEURAL PRN
Status: DISCONTINUED | OUTPATIENT
Start: 2023-08-04 | End: 2023-08-04 | Stop reason: HOSPADM

## 2023-08-04 RX ORDER — TRIAMCINOLONE ACETONIDE 40 MG/ML
INJECTION, SUSPENSION INTRA-ARTICULAR; INTRAMUSCULAR PRN
Status: DISCONTINUED | OUTPATIENT
Start: 2023-08-04 | End: 2023-08-04 | Stop reason: HOSPADM

## 2023-08-04 NOTE — OP NOTE
Pre procedure Diagnosis: Meralgia paresthetica  Post procedure Diagnosis: Same  Procedure performed: left lateral femoral cutaneous nerve block  Anesthesia: local  Complications: none immediately noted  Operators: Tony Juan MD    Indications:   Jamel Noel is a 68 year old male was sent by myself for left lateral femoral cutaneous nerve blocks    Options/alternatives, benefits and risks were discussed with the patient including bleeding, infection, tissue trauma, reaction to medications including seizure, nerve injury, weakness, and numbness.  Questions were answered to his  satisfaction and she agrees to proceed. Voluntary informed consent was obtained and signed.     Vitals were reviewed: Yes  Allergies were reviewed:  Yes   Medications were reviewed:  Yes   Pre-procedure pain score: 3/10    Procedure:  After getting informed consent, patient was brought into the procedure suite and was placed in a supine position on the procedure table.       A procedural pause verifying correct patient name, allergies, and surgical site was performed immediately prior to beginning the procedure.    Using ultrasound guidance a linear probe was placed laterally inferior to the ASIS. The sartorius and tensor fascia maria del carmen muscles were identified in short axis.  The target lateral femoral cutaneous nerve was identified as a hypoechoic oval structure with a hyperechoic rim on the superolateral edge of the sartorius muscle.    In this position using an in plane technique, the skin overlying the entry site and subcutaneous tissue estimating the procedure needle trajectory was anesthetized using 2mL of 1% lidocaine with a 25-gauge, 1.5 inch needle.     Noting that this needle was sufficient in length and echogenicity to access the LFCN, the same needle was advanced further without removing it from the skin, A lateral to medial approach was taken to anterior the fascial plane between the sartorius muscle and tensor fascia  maria del carmen.    Using the attached syringe Lidocaine 1% was injected to visualize fluid collection in this layer. Once verified without moving the needle, the saline syringe was removed and the syringe containing 4mL ropivacaine 0.5% and 40mg triamcinolone was connected to the extension tubing and injected.     Hemostasis was achieved, the area was cleaned, and bandaids were placed when appropriate.    The patient tolerated the procedure well. The patient was carefully escorted to the recovery room in stable condition. After meeting discharge criteria, the patient was discharged home.      Post-procedure pain score: 3/10  Follow-up includes:   -f/u with referring provider    Tony Juan MD  Interventional Pain Medicine  [facility]

## 2023-08-04 NOTE — H&P
Jamel Noel  [id number]  male  68 year old      Reason for procedure/surgery: meralgia paresthetica, left    Patient Active Problem List   Diagnosis    Spinal stenosis of lumbar region at multiple levels    Left inguinal hernia    Umbilical hernia with obstruction    History of hernia repair    Implantable loop recorder present    Syncope, unspecified syncope type    Chronic fatigue    Meralgia paresthetica, left       Past Surgical History:    Past Surgical History:   Procedure Laterality Date    COLONOSCOPY      EP LOOP RECORDER IMPLANT N/A 4/25/2023    Procedure: Loop Recorder Implant;  Surgeon: Johnson Goldsmith MD;  Location: [facility] LAB CV    HERNIORRHAPHY UMBILICAL N/A 9/16/2022    Procedure: HERNIORRHAPHY, UMBILICAL, OPEN;  Surgeon: Cooper Armijo MD;  Location: [facility]    INJECT NERVE BLOCK ILIOINGUINAL Left 3/24/2023    Procedure: INJECT NERVE BLOCK ILIOINGUINAL;  Surgeon: Tony Juan MD;  Location: [facility] OR    LAPAROSCOPIC HERNIORRHAPHY INGUINAL Left 9/16/2022    Procedure: HERNIORRHAPHY, INGUINAL, LAPAROSCOPIC, HERNIORRHAPHY, UMBILICAL, OPEN;  Surgeon: Cooper Armijo MD;  Location: [facility] OR    OTHER SURGICAL HISTORY      vastectomy    OTHER SURGICAL HISTORY Bilateral 2007    carpal tunnel repair       Past Medical History:   Past Medical History:   Diagnosis Date    Chronic sinusitis     GERD (gastroesophageal reflux disease)     Hyperlipemia     Hypertension     Lumbar spondylolysis     Peripheral neuropathy     Renal disease     Syncope, unspecified syncope type 7/20/2023       Social History:   Social History     Tobacco Use    Smoking status: Never    Smokeless tobacco: Never   Substance Use Topics    Alcohol use: Not Currently     Comment: Alcoholic Drinks/day: no drinking for 5 years       Family History: History reviewed. No pertinent family history.    Allergies:   Allergies   Allergen Reactions    Banana Itching     Itching throat    Nuts - Unspecified  "[Nuts] Itching     Itching throat    Grass     Other Environmental Allergy Other (See Comments)     Grass Hayfever  Itching throat runny eyes       Active Medications:   Current Outpatient Medications   Medication Sig Dispense Refill    acetaminophen (TYLENOL) 500 MG tablet [ACETAMINOPHEN (TYLENOL) 500 MG TABLET] Take 1 tablet (500 mg total) by mouth every 4 (four) hours.  0    amLODIPine (NORVASC) 10 MG tablet Take 10 mg by mouth every morning      atenolol (TENORMIN) 25 MG tablet atenolol 25 mg tablet   TAKE 1 TABLET BY MOUTH 1 TIME EACH DAY.      atorvastatin (LIPITOR) 20 MG tablet [ATORVASTATIN (LIPITOR) 20 MG TABLET] Take 20 mg by mouth at bedtime.      cephalexin 250 MG TABS Take by mouth 2 times daily      docusate sodium (COLACE) 100 MG capsule Take 1 capsule by mouth      gabapentin (NEURONTIN) 300 MG capsule Take 300 mg by mouth 3 times daily      loratadine (CLARITIN) 10 MG tablet Take 1 tablet by mouth      losartan (COZAAR) 100 MG tablet losartan 100 mg tablet   TAKE 1 TABLET BY MOUTH 1 TIME EACH DAY.      multivitamin (CENTRUM SILVER) tablet       omeprazole (PRILOSEC) 20 MG DR capsule Take 1 capsule by mouth      vitamin B complex with vitamin C (VITAMIN  B COMPLEX) tablet       vitamin D3 (CHOLECALCIFEROL) 125 MCG (5000 UT) tablet Take 1 tablet by mouth         Systemic Review:   CONSTITUTIONAL: NEGATIVE for fever, chills, change in weight  ENT/MOUTH: NEGATIVE for ear, mouth and throat problems  RESP: NEGATIVE for significant cough or SOB  CV: NEGATIVE for chest pain, palpitations or peripheral edema    Physical Examination:   Vital Signs: BP (!) 140/97   Pulse 61   Resp 16   Ht 1.778 m (5' 10\")   Wt 113.4 kg (250 lb)   SpO2 95%   BMI 35.87 kg/m    GENERAL: healthy appearing, alert and no distress  NECK: no adenopathy, no asymmetry, masses, or scars  RESP: normal work of breathing on room air  CV: warm, well perfused extremities, normal capillary refill  ABDOMEN: nondistended  MS: no gross " musculoskeletal defects noted, no edema    Plan: Appropriate to proceed as scheduled.    Tony Juan MD  Interventional Pain Medicine  [facility]    8/4/2023

## 2023-08-04 NOTE — DISCHARGE INSTRUCTIONS
PAIN INJECTION HOME CARE INSTRUCTIONS  Activity  -You may resume most normal activity levels with the exception of strenuous activity. It may help to move in ways that hurt before the injection, to see if the pain is still there, but do not overdo it.     -DO NOT remove bandaid for 24 hours  -DO NOT shower for 24 hours      Pain  -You may feel immediate pain relief and numbness for a period of time after the injection. This may indicate the medication has reached the right spot.  -Your pain may return after this short pain-free period, or may even be a little worse for a day or two. It may be caused by needle irritation or by the medication itself. The medications usually take two or three days to start working, but can take as long as a week.    -You may use an ice pack for 20 minutes every 2 hours for the first 24 hours  -You may use a heating pad after the first 24 hours  -You may use Tylenol (acetaminophen) every 4 hours or other pain medicines as directed by your physician      DID YOU RECEIVE SEDATION TODAY?  No    If you received sedation please follow these additional safety measures.    Sedation medicine, if given, may remain active for many hours. It is important for the next 24 hours that you do not:  -Drive a car  -Operate machines or power tools  -Consume alcohol, including beer  -Sign any important papers or legal documents    DID YOU RECEIVE STEROIDS TODAY?  No    Common side effects of steroids:  Not everyone will experience corticosteroid side effects. If side effects are experienced, they will gradually subside in the 7-10 day period following an injection. Most common side effects include:  -Flushed face and/or chest  -Feeling of warmth, particularly in the face but could be an overall feeling of warmth  -Increased blood sugar in diabetic patients  -Menstrual irregularities my occur. If taking hormone-based birth control an alternate method of birth control is recommended  -Sleep disturbances  and/or mood swings are possible  -Leg cramps    PLEASE KEEP TRACK OF YOUR SYMPTOMS AND NOTE ANY CHANGES FOR YOUR DOCTOR.       Please contact us if you have:  -Severe pain  -Fever more than 101.5 degrees Fahrenheit  -Signs of infection at the injection site (redness, swelling, or drainage)      FOR PAIN CENTER PATIENTS:  If you have questions, please contact the Pain Clinic at [phone] Option 1 between the hours of 7:00 am and 3:00 pm Monday through Friday. After office hours you can contact the on call provider by dialing [phone]. If you need immediate attention, we recommend that you go to a hospital emergency room or dial [phone]. If you need to Fax information, the number is [phone].      FOR PM&R PATIENTS:  For patients seen by the PM and R service, please call [phone]. (Monday through Friday 8a-5p.  After business hours and weekends call [phone] and ask for the PM and R doctor on call). If you need to fax a pain diary to PM&R the fax number is [phone]. If you are unable to fax, uploading to [facility] is encouraged, then send to provider. If you have procedure scheduling questions please call [phone] Option #2.

## 2023-08-07 ENCOUNTER — HOSPITAL ENCOUNTER (EMERGENCY)
Facility: HOSPITAL | Age: 68
Discharge: HOME | End: 2023-08-07
Attending: EMERGENCY MEDICINE | Admitting: EMERGENCY MEDICINE
Payer: COMMERCIAL

## 2023-08-07 VITALS
DIASTOLIC BLOOD PRESSURE: 91 MMHG | TEMPERATURE: 97 F | WEIGHT: 252.1 LBS | HEART RATE: 54 BPM | SYSTOLIC BLOOD PRESSURE: 149 MMHG | OXYGEN SATURATION: 96 % | HEIGHT: 70 IN | BODY MASS INDEX: 36.09 KG/M2 | RESPIRATION RATE: 18 BRPM

## 2023-08-07 DIAGNOSIS — I15.8 OTHER SECONDARY HYPERTENSION: ICD-10-CM

## 2023-08-07 LAB
ANION GAP SERPL CALCULATED.3IONS-SCNC: 10 MMOL/L (ref 7–15)
BUN SERPL-MCNC: 18 MG/DL (ref 8–23)
CALCIUM SERPL-MCNC: 9.3 MG/DL (ref 8.8–10.2)
CHLORIDE SERPL-SCNC: 105 MMOL/L (ref 98–107)
CREAT SERPL-MCNC: 0.83 MG/DL (ref 0.67–1.17)
DEPRECATED HCO3 PLAS-SCNC: 25 MMOL/L (ref 22–29)
ERYTHROCYTE [DISTWIDTH] IN BLOOD BY AUTOMATED COUNT: 12.3 % (ref 10–15)
GFR SERPL CREATININE-BSD FRML MDRD: >90 ML/MIN/1.73M2
GLUCOSE SERPL-MCNC: 116 MG/DL (ref 70–99)
HCT VFR BLD AUTO: 46.5 % (ref 40–53)
HGB BLD-MCNC: 16.5 G/DL (ref 13.3–17.7)
MCH RBC QN AUTO: 32 PG (ref 26.5–33)
MCHC RBC AUTO-ENTMCNC: 35.5 G/DL (ref 31.5–36.5)
MCV RBC AUTO: 90 FL (ref 78–100)
PLATELET # BLD AUTO: 163 10E3/UL (ref 150–450)
POTASSIUM SERPL-SCNC: 4 MMOL/L (ref 3.4–5.3)
RBC # BLD AUTO: 5.15 10E6/UL (ref 4.4–5.9)
SODIUM SERPL-SCNC: 140 MMOL/L (ref 136–145)
WBC # BLD AUTO: 8.3 10E3/UL (ref 4–11)

## 2023-08-07 PROCEDURE — 36415 COLL VENOUS BLD VENIPUNCTURE: CPT | Performed by: EMERGENCY MEDICINE

## 2023-08-07 PROCEDURE — 85027 COMPLETE CBC AUTOMATED: CPT | Performed by: EMERGENCY MEDICINE

## 2023-08-07 PROCEDURE — 250N000013 HC RX MED GY IP 250 OP 250 PS 637: Performed by: EMERGENCY MEDICINE

## 2023-08-07 PROCEDURE — 99283 EMERGENCY DEPT VISIT LOW MDM: CPT

## 2023-08-07 PROCEDURE — 80048 BASIC METABOLIC PNL TOTAL CA: CPT | Performed by: EMERGENCY MEDICINE

## 2023-08-07 RX ORDER — CLONIDINE HYDROCHLORIDE 0.1 MG/1
0.2 TABLET ORAL ONCE
Status: COMPLETED | OUTPATIENT
Start: 2023-08-07 | End: 2023-08-07

## 2023-08-07 RX ORDER — CLONIDINE HYDROCHLORIDE 0.1 MG/1
0.1 TABLET ORAL 3 TIMES DAILY
Qty: 21 TABLET | Refills: 0 | Status: SHIPPED | OUTPATIENT
Start: 2023-08-07 | End: 2024-01-31

## 2023-08-07 RX ADMIN — CLONIDINE HYDROCHLORIDE 0.2 MG: 0.1 TABLET ORAL at 09:20

## 2023-08-07 ASSESSMENT — ACTIVITIES OF DAILY LIVING (ADL): ADLS_ACUITY_SCORE: 33

## 2023-08-07 NOTE — ED TRIAGE NOTES
Patient had nerve block injection on Left thigh on Friday. Since then patient's BP has been elevated. Patient usually takes atenolol and Losartan. This morning patient took only Atenolol due to elevated BP. Patient took medication 2 hours ago. Patient states he does have a slight headache but feels more like a buzzing. Patient also has bilateral eye swelling that has been going on for a year but eyes seem more swollen today.      Triage Assessment       Row Name 08/07/23 [id number]       Triage Assessment (Adult)    Airway WDL WDL       Respiratory WDL    Respiratory WDL WDL       Skin Circulation/Temperature WDL    Skin Circulation/Temperature WDL WDL       Cardiac WDL    Cardiac WDL WDL       Peripheral/Neurovascular WDL    Peripheral Neurovascular WDL WDL       Cognitive/Neuro/Behavioral WDL    Cognitive/Neuro/Behavioral WDL WDL

## 2023-08-07 NOTE — ED PROVIDER NOTES
EMERGENCY DEPARTMENT ENCOUNTER      NAME: Jamel Noel  AGE: 68 year old male  YOB: 1955  MRN: [id number]  EVALUATION DATE & TIME: 8/7/2023  8:52 AM    PCP: Yvan Vergara    ED PROVIDER: García Gonzalez M.D.      Chief Complaint   Patient presents with    Hypertension         FINAL IMPRESSION:  Hypertension      ED COURSE & MEDICAL DECISION MAKING:    Pertinent Labs & Imaging studies reviewed. (See chart for details)  68 year old male presents to the Emergency Department for evaluation of blood pressure.  Patient reports his blood pressures been trending upwards over the last few days since having an injection for nerve block in his left thigh on Friday.  Has been taking all his medications as directed and actually took an additional dose of atenolol this morning.  Denies any obvious changes in diet or salt intake.  Does report feeling slightly under the weather.  Denies shortness of breath or chest pain.  Review of records indicate electively recent change from hydralazine and amlodipine to atenolol and losartan.  Patient also with recent initiation of CPAP for sleep apnea which both he and his wife state he has been using routinely.  On exam he is a moderately obese male in mild distress.  Minimal periorbital soft tissue swelling/edema which is normal.  Heart and lungs unremarkable.  No lower extremity edema.  Patient with elevated blood pressure around 200 systolic on arrival with minimal symptomatology.  Will provide clonidine for additional blood pressure management.  Out of caution will obtain basic metabolic to assess renal function and hemoglobin.  Patient likely with slightly elevated blood pressure secondary to discomfort and recent steroid injection.  Given absence of significant neurologic findings/headache no indications for CT imaging or MRI.  Patient appears non toxic with stable vitals signs. Overall exam is benign.        9:01 AM I met with the patient for the initial  interview and physical examination. Discussed plan for treatment and workup in the ED.    10:05 AM.  Renal function normal.  Minimal hyperglycemia.  CBC unremarkable.  Blood pressure moderated approximately 180s systolic.  Patient likely with elevated blood pressure secondary discomfort and recent steroid injection.  We will continue outpatient as needed clonidine.  Patient remained on his typical medications.  Expectation is for moderation of blood pressure over the next 3 to 5 days.  Follow-up with primary care physician as needed.  At the conclusion of the encounter I discussed the results of all of the tests and the disposition. The questions were answered and return precautions provided. The patient or family acknowledged understanding and was agreeable with the care plan.     Medical Decision Making    History:  Supplemental history from: Documented in chart, if applicable  External Record(s) reviewed: Documented in chart, if applicable. and Other: /25/23 at [facility]     Work Up:  Chart documentation includes differential considered and any EKGs or imaging independently interpreted by provider, where specified.  In additional to work up documented, I considered the following work up: Documented in chart, if applicable.    External consultation:  Discussion of management with another provider:     Complicating factors:  Care impacted by chronic illness: Hyperlipidemia and Hypertension  Care affected by social determinants of health: Access to Medical Care    Disposition considerations: Discharge. I prescribed additional prescription strength medication(s) as charted. See documentation for any additional details.       PPE: Provider wore gloves, N95 mask, eye protection, surgical cap, and paper mask.     MEDICATIONS GIVEN IN THE EMERGENCY:  Medications   cloNIDine (CATAPRES) tablet 0.2 mg (has no administration in time range)       NEW PRESCRIPTIONS STARTED AT TODAY'S ER VISIT  Current Discharge  Medication List        START taking these medications    Details   cloNIDine (CATAPRES) 0.1 MG tablet Take 1 tablet (0.1 mg) by mouth 3 times daily  Qty: 21 tablet, Refills: 0                 =================================================================    HPI    Patient information was obtained from: Patient    Use of Intrepreter: N/A       Jamel Noel is a 68 year old male with a pertient medical history of spinal stenosis with associated steroid injections, hypertension, and hyperlipidemia who presents to the ED for evaluation of hypertension.    Per chart review: Patient was seen on 7/25/23 at [facility] for evaluation of acute renal failure syndrome. Using CPAP for YESIKA. Discussed weight reduction to control blood pressure. Told to continue taking Atenolol and Losartan and to follow up with cardiology regarding dosing.     For the past 2-3 days, patient reports his blood pressures being around 160/90s. Within the last day and a half, patient reports these have elevated even higher to around 200/111. Normally, patient reports his pressures being around 140/70s. He notes that he's been on maintenance blood pressure medications for years and hasn't missed any doses of these. Last night, he reports having trouble sleeping and not feeling well, so he did take another dose of his Atenolol around 0730.    Patient reports that he recently had a nerve block injection into his left thigh. He does have a history of having steroid injections in the past. Reports a prior back fusion surgery. He also mentions using his CPAP faithfully and has not missed.     Denies any increased sodium intake in his diet, leg pain, nausea, vomiting, or diarrhea.      Otherwise in normal state of health. No further concerns at this time.       REVIEW OF SYSTEMS   Constitutional:  Denies fever, chills, diet change, positive for having trouble sleeping, increased blood pressures  Respiratory:  Denies productive cough or  increased work of breathing  Cardiovascular:  Denies chest pain, palpitations  GI:  Denies abdominal pain, nausea, vomiting, or change in bowel or bladder habits   Musculoskeletal:  Denies any new muscle/joint swelling  Skin:  Denies rash   Neurologic:  Denies focal weakness  All systems negative except as marked.     PAST MEDICAL HISTORY:  Past Medical History:   Diagnosis Date    Chronic sinusitis     GERD (gastroesophageal reflux disease)     Hyperlipemia     Hypertension     Lumbar spondylolysis     Peripheral neuropathy     Renal disease     Syncope, unspecified syncope type 7/20/2023       PAST SURGICAL HISTORY:  Past Surgical History:   Procedure Laterality Date    COLONOSCOPY      EP LOOP RECORDER IMPLANT N/A 4/25/2023    Procedure: Loop Recorder Implant;  Surgeon: Johnson Goldsmith MD;  Location: [facility] CV    HERNIORRHAPHY UMBILICAL N/A 9/16/2022    Procedure: HERNIORRHAPHY, UMBILICAL, OPEN;  Surgeon: Cooper Armijo MD;  Location: [facility]    INJECT NERVE BLOCK ILIOINGUINAL Left 3/24/2023    Procedure: INJECT NERVE BLOCK ILIOINGUINAL;  Surgeon: Tony Juan MD;  Location: [facility] OR    LAPAROSCOPIC HERNIORRHAPHY INGUINAL Left 9/16/2022    Procedure: HERNIORRHAPHY, INGUINAL, LAPAROSCOPIC, HERNIORRHAPHY, UMBILICAL, OPEN;  Surgeon: Cooper Armijo MD;  Location: [facility]    NERVE BLOCK PERIPHERAL Left 8/4/2023    Procedure: BLOCK, NERVE, PERIPHERAL - Left Lateral Femoral Cutaneous Nerve Block;  Surgeon: Tony Juan MD;  Location: [facility] OR    OTHER SURGICAL HISTORY      vastectomy    OTHER SURGICAL HISTORY Bilateral 2007    carpal tunnel repair         CURRENT MEDICATIONS:      Current Facility-Administered Medications:     cloNIDine (CATAPRES) tablet 0.2 mg, 0.2 mg, Oral, Once, García Gonzalez MD    Current Outpatient Medications:     acetaminophen (TYLENOL) 500 MG tablet, [ACETAMINOPHEN (TYLENOL) 500 MG TABLET] Take 1 tablet (500 mg total) by mouth every 4 (four)  hours., Disp: , Rfl: 0    amLODIPine (NORVASC) 10 MG tablet, Take 10 mg by mouth every morning, Disp: , Rfl:     atenolol (TENORMIN) 25 MG tablet, atenolol 25 mg tablet  TAKE 1 TABLET BY MOUTH 1 TIME EACH DAY., Disp: , Rfl:     atorvastatin (LIPITOR) 20 MG tablet, [ATORVASTATIN (LIPITOR) 20 MG TABLET] Take 20 mg by mouth at bedtime., Disp: , Rfl:     cephalexin 250 MG TABS, Take by mouth 2 times daily, Disp: , Rfl:     docusate sodium (COLACE) 100 MG capsule, Take 1 capsule by mouth, Disp: , Rfl:     gabapentin (NEURONTIN) 300 MG capsule, Take 300 mg by mouth 3 times daily, Disp: , Rfl:     loratadine (CLARITIN) 10 MG tablet, Take 1 tablet by mouth, Disp: , Rfl:     losartan (COZAAR) 100 MG tablet, losartan 100 mg tablet  TAKE 1 TABLET BY MOUTH 1 TIME EACH DAY., Disp: , Rfl:     multivitamin (CENTRUM SILVER) tablet, , Disp: , Rfl:     omeprazole (PRILOSEC) 20 MG DR capsule, Take 1 capsule by mouth, Disp: , Rfl:     vitamin B complex with vitamin C (VITAMIN  B COMPLEX) tablet, , Disp: , Rfl:     vitamin D3 (CHOLECALCIFEROL) 125 MCG (5000 UT) tablet, Take 1 tablet by mouth, Disp: , Rfl:     ALLERGIES:  Allergies   Allergen Reactions    Banana Itching     Itching throat    Nuts - Unspecified [Nuts] Itching     Itching throat    Grass     Other Environmental Allergy Other (See Comments)     Grass Hayfever  Itching throat runny eyes       FAMILY HISTORY:  History reviewed. No pertinent family history.    SOCIAL HISTORY:   Social History     Socioeconomic History    Marital status:      Spouse name: None    Number of children: None    Years of education: None    Highest education level: None   Tobacco Use    Smoking status: Never    Smokeless tobacco: Never   Vaping Use    Vaping Use: Never used   Substance and Sexual Activity    Alcohol use: Not Currently     Comment: Alcoholic Drinks/day: no drinking for 5 years    Drug use: Never    Sexual activity: Not Currently       VITALS:  Patient Vitals for the past 24  "hrs:   BP Temp Pulse Resp SpO2 Height Weight   08/07/23 0844 (!) 200/111 97  F (36.1  C) 60 18 98 % 1.778 m (5' 10\") 114.4 kg (252 lb 1.6 oz)        PHYSICAL EXAM    Constitutional:  Awake, alert, in no apparent distress, obese  HENT:  Normocephalic, Atraumatic. Bilateral external ears normal. Oropharynx moist. Nose normal. Neck- Normal range of motion with no guarding,   Eyes:  PERRL, EOMI with no signs of entrapment, Conjunctiva normal, No discharge. Minimal periorbital edema.   Respiratory:  Normal breath sounds, No respiratory distress, No wheezing.    Cardiovascular:  Normal heart rate, Normal rhythm, No appreciable rubs or gallops.   GI:  Soft, No tenderness, No distension, No palpable masses  Musculoskeletal: No lower extremity edema. Good range of motion in all major joints. No tenderness to palpation or major deformities noted.  Integument:  Warm, Dry, No erythema, No rash.   Neurologic:  Alert & oriented, Normal motor function, Normal sensory function, No focal deficits noted.   Psychiatric:  Affect normal, Judgment normal, Mood normal.     LAB:  All pertinent labs reviewed and interpreted.     Results for orders placed or performed during the hospital encounter of 08/07/23   Basic metabolic panel     Status: Abnormal   Result Value Ref Range    Sodium 140 136 - 145 mmol/L    Potassium 4.0 3.4 - 5.3 mmol/L    Chloride 105 98 - 107 mmol/L    Carbon Dioxide (CO2) 25 22 - 29 mmol/L    Anion Gap 10 7 - 15 mmol/L    Urea Nitrogen 18.0 8.0 - 23.0 mg/dL    Creatinine 0.83 0.67 - 1.17 mg/dL    Calcium 9.3 8.8 - 10.2 mg/dL    Glucose 116 (H) 70 - 99 mg/dL    GFR Estimate >90 >60 mL/min/1.73m2   CBC (+ platelets, no diff)     Status: Normal   Result Value Ref Range    WBC Count 8.3 4.0 - 11.0 10e3/uL    RBC Count 5.15 4.40 - 5.90 10e6/uL    Hemoglobin 16.5 13.3 - 17.7 g/dL    Hematocrit 46.5 40.0 - 53.0 %    MCV 90 78 - 100 fL    MCH 32.0 26.5 - 33.0 pg    MCHC 35.5 31.5 - 36.5 g/dL    RDW 12.3 10.0 - 15.0 %    " Platelet Count 163 150 - 450 10e3/uL          I, Reena Mariano, am serving as a scribe to document services personally performed by García Gonzalez MD, based on my observation and the provider's statements to me. I, García Gonzalez MD attest that Reena Mariano is acting in a scribe capacity, has observed my performance of the services and has documented them in accordance with my direction.    García Gonzalez M.D.  Emergency Medicine  [facility] EMERGENCY DEPARTMENT       García Gonzalez MD  08/07/23 1007       García Gonzalez MD  08/07/23 1012

## 2023-08-09 ENCOUNTER — ANCILLARY PROCEDURE (OUTPATIENT)
Dept: CARDIOLOGY | Facility: CLINIC | Age: 68
End: 2023-08-09
Attending: INTERNAL MEDICINE
Payer: COMMERCIAL

## 2023-08-09 DIAGNOSIS — Z95.818 IMPLANTABLE LOOP RECORDER PRESENT: ICD-10-CM

## 2023-08-09 DIAGNOSIS — R55 SYNCOPE, UNSPECIFIED SYNCOPE TYPE: ICD-10-CM

## 2023-08-09 LAB
MDC_IDC_EPISODE_DTM: NORMAL
MDC_IDC_EPISODE_ID: NORMAL
MDC_IDC_EPISODE_TYPE: NORMAL
MDC_IDC_MSMT_BATTERY_DTM: NORMAL
MDC_IDC_MSMT_BATTERY_STATUS: NORMAL
MDC_IDC_PG_IMPLANT_DTM: NORMAL
MDC_IDC_PG_MFG: NORMAL
MDC_IDC_PG_MODEL: NORMAL
MDC_IDC_PG_SERIAL: NORMAL
MDC_IDC_PG_TYPE: NORMAL
MDC_IDC_SESS_CLINIC_NAME: NORMAL
MDC_IDC_SESS_DTM: NORMAL
MDC_IDC_SESS_TYPE: NORMAL
MDC_IDC_STAT_AT_BURDEN_PERCENT: 0 %
MDC_IDC_STAT_AT_DTM_END: NORMAL
MDC_IDC_STAT_AT_DTM_START: NORMAL

## 2023-08-09 PROCEDURE — G2066 INTER DEVC REMOTE 30D: HCPCS | Performed by: INTERNAL MEDICINE

## 2023-08-09 PROCEDURE — 93297 REM INTERROG DEV EVAL ICPMS: CPT | Performed by: INTERNAL MEDICINE

## 2023-09-06 ENCOUNTER — TELEPHONE (OUTPATIENT)
Dept: CARDIOLOGY | Facility: CLINIC | Age: 68
End: 2023-09-06
Payer: COMMERCIAL

## 2023-09-06 NOTE — TELEPHONE ENCOUNTER
No charge. No Epic interface required.  Type: alert remote loop recorder transmission for symptom.  Presenting rhythm: normal sinus, rate 60's.  Battery status: OK  Arrhythmias: since 8/9/23, none detected. One symptom episode today at 10AM; recording shows normal sinus rhythm rate 60's, with one dropped beat (2 second pause), and rare PACs.  Comments: normal loop recorder function.  Plan: routed to device RN.  DAYANA Solis, Device Specialist      Results reviewed with patient.  States he was feeling lightheaded so he sent a loop recorder transmission.  However symptoms were not isolated states his symptoms of lightheadedness and balance issues seem to have started the night before on Monday n states Monday he was at a family picnic that started outdoors but ended indoors due to heat.  Also stated that he did some work outdoors earlier in the morning but nothing too strenuous.  Question whether or not he was a bit dehydrated.  He states he was worried about being dehydrated as well and so started to drink extra fluids yesterday afternoon and into the evening.  By evening he states he was feeling better and today as well.  Also reported feeling somewhat nauseated on Monday. States his BP remained around 140s/90s with HR in the 50s      Reviewed with patient that his rhythm strip did not seem to show a significant concern although there was a brief 2-second pause.  However his symptoms lasted for at least 12 to 16 hours intermittently, device did not record any abnormal rhythms.   Discussed that it was possibly dehydration and encouraged him to continue plenty of fluids throughout the rest of this week.  Advised to use the loop recorder should he have any sudden lightheadedness, near syncope, or syncope.    Patient verbalized understanding and agrees with this plan. Will review with Dr. Francois for any additional recommendations.     Elen Narvaez RN

## 2023-09-07 NOTE — TELEPHONE ENCOUNTER
Omid Francois MD Gebel, Elen KELLER RN  Caller: Unspecified (Yesterday,  3:52 PM)  Thank you Elen, we will await any worrisome rhythm disturbances.    Omid    Above noted.  Elen Narvaez, RN

## 2023-10-10 ASSESSMENT — PAIN SCALES - PAIN ENJOYMENT GENERAL ACTIVITY SCALE (PEG)
AVG_PAIN_PASTWEEK: 1
INTERFERED_ENJOYMENT_LIFE: 1
INTERFERED_GENERAL_ACTIVITY: 1
PEG_TOTALSCORE: 1

## 2023-10-17 ENCOUNTER — OFFICE VISIT (OUTPATIENT)
Dept: PALLIATIVE MEDICINE | Facility: OTHER | Age: 68
End: 2023-10-17
Attending: STUDENT IN AN ORGANIZED HEALTH CARE EDUCATION/TRAINING PROGRAM
Payer: COMMERCIAL

## 2023-10-17 VITALS — SYSTOLIC BLOOD PRESSURE: 146 MMHG | HEART RATE: 61 BPM | DIASTOLIC BLOOD PRESSURE: 79 MMHG | OXYGEN SATURATION: 95 %

## 2023-10-17 DIAGNOSIS — R10.32 LEFT GROIN PAIN: ICD-10-CM

## 2023-10-17 DIAGNOSIS — G57.12 MERALGIA PARESTHETICA, LEFT: Primary | ICD-10-CM

## 2023-10-17 PROCEDURE — G0463 HOSPITAL OUTPT CLINIC VISIT: HCPCS | Performed by: STUDENT IN AN ORGANIZED HEALTH CARE EDUCATION/TRAINING PROGRAM

## 2023-10-17 PROCEDURE — 99214 OFFICE O/P EST MOD 30 MIN: CPT | Performed by: STUDENT IN AN ORGANIZED HEALTH CARE EDUCATION/TRAINING PROGRAM

## 2023-10-17 ASSESSMENT — PAIN SCALES - GENERAL: PAINLEVEL: NO PAIN (1)

## 2023-10-17 NOTE — PATIENT INSTRUCTIONS
Physical Therapy:  Continue staying active - recommend aggressive weight loss and strength training to help reduced recurrence of meralgia paresthetica pain  Diagnostic Studies:  none  Medication Management:  Continue gabapentin 300mg TID  Further procedures recommended: none for now  Can repeat left lateral femoral cutaneous nerve block as needed  Ok to repeat the left ilioinguinal/iliohypogastric nerve block if pain returns  Follow up: 3 months    Tony Juan MD  Interventional Pain Medicine  [facility]

## 2023-10-17 NOTE — NURSING NOTE
Patient presents to the clinic today for a follow up with Tony Juan MD  regarding Pain Management.           7/20/2023     1:55 PM 7/20/2023     1:57 PM 10/17/2023     8:45 AM   PEG Score   PEG Total Score 0 0 1        Nupur Miranda MA  [facility]

## 2023-10-17 NOTE — PROGRESS NOTES
[facility] Follow-up    Date of visit: 10/17/2023    Chief complaint:   Chief Complaint   Patient presents with    Pain     Follow up from Nerve block.       Interval history:  Since his last visit, Jamel Noel reports:  Great improvement from injection, 100% relief. Only started returning 2 weeks back.  Did have hypertensive episode 36 hours the injection, was seen in ER and given clonidine.  BP has been ok since.    Tried reducing gabapentin from TID to BID but neuropathy in the hands flared, went back to TID which reduced it. Tried once more and the same thing happened.    Notices after injection was able to help tear down a deck, dance with wife.    Pain scores:  Pain intensity currently is 1 on a scale of 0-10.     Current pain treatments:   Gabapentin 300mg TID - helpful     Other treatments have included:  Jamel Noel has not been seen at a pain clinic in the past.    PT: none  Injections:    8/4/23 - Left LFCN block - helpful   3/24/23 - left ilioinguinal/iliohypogastric block - helpful  Surgery:              Left inguinal and umbilical hernia repair with Dr. Armijo               Kidney stone removal  Alternative: none    Side Effects: Hypertension from steroid injection    Medications:  Current Outpatient Medications   Medication Sig Dispense Refill    acetaminophen (TYLENOL) 500 MG tablet [ACETAMINOPHEN (TYLENOL) 500 MG TABLET] Take 1 tablet (500 mg total) by mouth every 4 (four) hours.  0    atenolol (TENORMIN) 25 MG tablet atenolol 25 mg tablet   TAKE 1 TABLET BY MOUTH 1 TIME EACH DAY.      atorvastatin (LIPITOR) 20 MG tablet [ATORVASTATIN (LIPITOR) 20 MG TABLET] Take 20 mg by mouth at bedtime.      cephalexin 250 MG TABS Take by mouth 2 times daily      docusate sodium (COLACE) 100 MG capsule Take 1 capsule by mouth      gabapentin (NEURONTIN) 300 MG capsule Take 300 mg by mouth 3 times daily      loratadine (CLARITIN) 10 MG tablet Take 1  tablet by mouth      losartan (COZAAR) 100 MG tablet losartan 100 mg tablet   TAKE 1 TABLET BY MOUTH 1 TIME EACH DAY.      multivitamin (CENTRUM SILVER) tablet       omeprazole (PRILOSEC) 20 MG DR capsule Take 1 capsule by mouth      vitamin B complex with vitamin C (VITAMIN  B COMPLEX) tablet       vitamin D3 (CHOLECALCIFEROL) 125 MCG (5000 UT) tablet Take 1 tablet by mouth      amLODIPine (NORVASC) 10 MG tablet Take 10 mg by mouth every morning      cloNIDine (CATAPRES) 0.1 MG tablet Take 1 tablet (0.1 mg) by mouth 3 times daily 21 tablet 0       Medical History: any changes in medical history since they were last seen? No    Physical Exam:  Blood pressure (!) 146/79, pulse 61, SpO2 95%.  Constitutional: Well developed, NAD  Head: normocephalic. Atraumatic.   Eyes: no redness or jaundice noted   CV: warm, well perfused extremities   Skin: no suspicious lesions or rashes   Psychiatric: mentation appears normal and affect full    Diagnostics:  Blood glucose from last     Assessment:   1. Post-herniorraphy pain syndrome, left  2. Left meralgia paresthetica     Jamel Noel is a 68 year old male who is seen at the pain clinic for left post-hernorrhaphy pain syndrome and left meralgia paresthetica. We performed a left ilioinguinal/iliohypogastric block which has provided great relief. He noted then that his left anterolateral thigh pain had worsened and he would like to proceed interventionally. We previously diagnosed this as meralgia paresthetica based on the distribution of pain. We performed a left LFCN block which again provided found relief.  He notes significant functional improvements as well with the ability to dance with his wife at a recent function as well as helping to tear down a deck.  We will repeat these injections as needed.  He did try to wean down his gabapentin but noticed bilateral hand neuropathy flared, so he resumed 3 times daily dosing.  We will see him back in 3 months.      Plan:  Physical Therapy:  Continue staying active - recommend aggressive weight loss and strength training to help reduced recurrence of meralgia paresthetica pain  Diagnostic Studies:  none  Medication Management:  Continue gabapentin 300mg TID  Further procedures recommended: none for now  Can repeat left lateral femoral cutaneous nerve block as needed  Ok to repeat the left ilioinguinal/iliohypogastric nerve block if pain returns  Follow up: 3 months    Tony Juan MD  Interventional Pain Medicine  [facility]

## 2023-10-20 ENCOUNTER — TELEPHONE (OUTPATIENT)
Dept: CARDIOLOGY | Facility: CLINIC | Age: 68
End: 2023-10-20
Payer: COMMERCIAL

## 2023-10-20 NOTE — TELEPHONE ENCOUNTER
No charge. No Epic interface required.  Type: alert remote loop recorder transmission for symptom (lightheaded).  Presenting rhythm: normal sinus, rate 53 bpm.  battery status: OK  Arrhythmias: since 9/5/23, none detected. One symptom episode on 10/19/23; recording shows normal sinus rhythm rate 60's-80's, with rare ectopy.  Comments: normal loop recorder function.  Plan: routed to device RN.   DAYANA Solis, Device Specialist    Occasional PVCs noted, no significant arrhythmias. Call placed to patient. No answer, LVM and will send [employer] message.     Elen Narvaez RN

## 2023-10-24 ENCOUNTER — LAB REQUISITION (OUTPATIENT)
Dept: LAB | Facility: CLINIC | Age: 68
End: 2023-10-24
Payer: COMMERCIAL

## 2023-10-24 ENCOUNTER — TRANSFERRED RECORDS (OUTPATIENT)
Dept: HEALTH INFORMATION MANAGEMENT | Facility: CLINIC | Age: 68
End: 2023-10-24

## 2023-10-24 DIAGNOSIS — R23.3 SPONTANEOUS ECCHYMOSES: ICD-10-CM

## 2023-10-24 DIAGNOSIS — E78.2 MIXED HYPERLIPIDEMIA: ICD-10-CM

## 2023-10-24 DIAGNOSIS — I10 ESSENTIAL (PRIMARY) HYPERTENSION: ICD-10-CM

## 2023-10-24 DIAGNOSIS — Z12.5 ENCOUNTER FOR SCREENING FOR MALIGNANT NEOPLASM OF PROSTATE: ICD-10-CM

## 2023-10-24 LAB
ERYTHROCYTE [DISTWIDTH] IN BLOOD BY AUTOMATED COUNT: 12.8 % (ref 10–15)
HCT VFR BLD AUTO: 47 % (ref 40–53)
HGB BLD-MCNC: 16.3 G/DL (ref 13.3–17.7)
MCH RBC QN AUTO: 31.8 PG (ref 26.5–33)
MCHC RBC AUTO-ENTMCNC: 34.7 G/DL (ref 31.5–36.5)
MCV RBC AUTO: 92 FL (ref 78–100)
PLATELET # BLD AUTO: 170 10E3/UL (ref 150–450)
RBC # BLD AUTO: 5.13 10E6/UL (ref 4.4–5.9)
WBC # BLD AUTO: 6.9 10E3/UL (ref 4–11)

## 2023-10-24 PROCEDURE — 80048 BASIC METABOLIC PNL TOTAL CA: CPT | Mod: ORL | Performed by: FAMILY MEDICINE

## 2023-10-24 PROCEDURE — 85027 COMPLETE CBC AUTOMATED: CPT | Mod: ORL | Performed by: FAMILY MEDICINE

## 2023-10-24 PROCEDURE — 80061 LIPID PANEL: CPT | Mod: ORL | Performed by: FAMILY MEDICINE

## 2023-10-24 PROCEDURE — G0103 PSA SCREENING: HCPCS | Mod: ORL | Performed by: FAMILY MEDICINE

## 2023-10-25 LAB
ANION GAP SERPL CALCULATED.3IONS-SCNC: 13 MMOL/L (ref 7–15)
BUN SERPL-MCNC: 12.4 MG/DL (ref 8–23)
CALCIUM SERPL-MCNC: 9.8 MG/DL (ref 8.8–10.2)
CHLORIDE SERPL-SCNC: 102 MMOL/L (ref 98–107)
CHOLEST SERPL-MCNC: 126 MG/DL
CREAT SERPL-MCNC: 0.93 MG/DL (ref 0.67–1.17)
DEPRECATED HCO3 PLAS-SCNC: 26 MMOL/L (ref 22–29)
EGFRCR SERPLBLD CKD-EPI 2021: 89 ML/MIN/1.73M2
GLUCOSE SERPL-MCNC: 95 MG/DL (ref 70–99)
HDLC SERPL-MCNC: 32 MG/DL
LDLC SERPL CALC-MCNC: 58 MG/DL
NONHDLC SERPL-MCNC: 94 MG/DL
POTASSIUM SERPL-SCNC: 5.1 MMOL/L (ref 3.4–5.3)
PSA SERPL DL<=0.01 NG/ML-MCNC: 3.99 NG/ML (ref 0–4.5)
SODIUM SERPL-SCNC: 141 MMOL/L (ref 135–145)
TRIGL SERPL-MCNC: 178 MG/DL

## 2023-10-31 ENCOUNTER — TRANSFERRED RECORDS (OUTPATIENT)
Dept: HEALTH INFORMATION MANAGEMENT | Facility: CLINIC | Age: 68
End: 2023-10-31

## 2023-10-31 ENCOUNTER — LAB REQUISITION (OUTPATIENT)
Dept: LAB | Facility: CLINIC | Age: 68
End: 2023-10-31
Payer: COMMERCIAL

## 2023-10-31 DIAGNOSIS — R23.3 SPONTANEOUS ECCHYMOSES: ICD-10-CM

## 2023-10-31 LAB
APTT PPP: 28 SECONDS (ref 22–38)
INR PPP: 1.06 (ref 0.85–1.15)

## 2023-10-31 PROCEDURE — 85730 THROMBOPLASTIN TIME PARTIAL: CPT | Mod: ORL | Performed by: FAMILY MEDICINE

## 2023-10-31 PROCEDURE — 85610 PROTHROMBIN TIME: CPT | Mod: ORL | Performed by: FAMILY MEDICINE

## 2023-11-03 ENCOUNTER — ANCILLARY PROCEDURE (OUTPATIENT)
Dept: CARDIOLOGY | Facility: CLINIC | Age: 68
End: 2023-11-03
Attending: INTERNAL MEDICINE
Payer: COMMERCIAL

## 2023-11-03 ENCOUNTER — TELEPHONE (OUTPATIENT)
Dept: CARDIOLOGY | Facility: CLINIC | Age: 68
End: 2023-11-03

## 2023-11-03 DIAGNOSIS — R55 SYNCOPE, UNSPECIFIED SYNCOPE TYPE: ICD-10-CM

## 2023-11-03 DIAGNOSIS — R53.82 CHRONIC FATIGUE: ICD-10-CM

## 2023-11-03 DIAGNOSIS — Z95.818 IMPLANTABLE LOOP RECORDER PRESENT: ICD-10-CM

## 2023-11-03 DIAGNOSIS — Z95.818 IMPLANTABLE LOOP RECORDER PRESENT: Primary | ICD-10-CM

## 2023-11-03 NOTE — TELEPHONE ENCOUNTER
"Type: alert remote loop recorder transmission for pause episode. Courtesy check.  Presenting rhythm: Sinus 50 bpm.   Battery status: OK.  Arrhythmias: since 10/20/23; One pause episode on 11/2/23 at 12:10PM, duration 3 seconds.   PLan: routed to device RN for review. HANNA Castillo, Device Specialist      Pause episode reviewed with patient. States he had worked out at gym yesterday morning, then went grocery shopping around lunch time. He does not recall any troublesome symptoms yesterday, no lightheadedness/near-syncope. \"Nothing out of the ordinary.\" Takes Atenolol 25 mg daily in evening. Is Aware of need to call/use symptom recorder with any events.     Denies any concerns at this time. Will update Dr. Francois with remote findings.     Elen Narvaez RN          "

## 2023-11-06 LAB
MDC_IDC_EPISODE_DTM: NORMAL
MDC_IDC_EPISODE_DTM: NORMAL
MDC_IDC_EPISODE_DURATION: 3 S
MDC_IDC_EPISODE_ID: NORMAL
MDC_IDC_EPISODE_ID: NORMAL
MDC_IDC_EPISODE_TYPE: NORMAL
MDC_IDC_EPISODE_TYPE: NORMAL
MDC_IDC_EPISODE_VENDOR_TYPE: NORMAL
MDC_IDC_MSMT_BATTERY_DTM: NORMAL
MDC_IDC_MSMT_BATTERY_STATUS: NORMAL
MDC_IDC_PG_IMPLANT_DTM: NORMAL
MDC_IDC_PG_MFG: NORMAL
MDC_IDC_PG_MODEL: NORMAL
MDC_IDC_PG_SERIAL: NORMAL
MDC_IDC_PG_TYPE: NORMAL
MDC_IDC_SESS_CLINIC_NAME: NORMAL
MDC_IDC_SESS_DTM: NORMAL
MDC_IDC_SESS_TYPE: NORMAL
MDC_IDC_STAT_AT_BURDEN_PERCENT: 0 %
MDC_IDC_STAT_AT_DTM_END: NORMAL
MDC_IDC_STAT_AT_DTM_START: NORMAL

## 2023-11-06 RX ORDER — ATENOLOL 25 MG/1
12.5 TABLET ORAL DAILY
Qty: 45 TABLET | Refills: 1 | Status: SHIPPED | OUTPATIENT
Start: 2023-11-06 | End: 2024-08-06

## 2023-11-06 NOTE — PROGRESS NOTES
Spoke with Pt regarding recommendations from Dr. Francois. Pt on agreement. Message sent to [facility]        From: Omid Francois MD  Sent: 11/3/2023   4:44 PM CST  To: Tracey Hoangi,    Patient had a 3-second pause on his implantable monitor.  Could he reduce his atenolol to 12.5 mg a day.  Could he see 1 electrophysiologist for a consult.    Thanks,    Omid

## 2023-11-09 ENCOUNTER — ANCILLARY PROCEDURE (OUTPATIENT)
Dept: CARDIOLOGY | Facility: CLINIC | Age: 68
End: 2023-11-09
Attending: INTERNAL MEDICINE
Payer: COMMERCIAL

## 2023-11-09 DIAGNOSIS — Z95.818 IMPLANTABLE LOOP RECORDER PRESENT: ICD-10-CM

## 2023-11-09 DIAGNOSIS — R55 SYNCOPE, UNSPECIFIED SYNCOPE TYPE: ICD-10-CM

## 2023-11-09 PROCEDURE — 93297 REM INTERROG DEV EVAL ICPMS: CPT | Performed by: INTERNAL MEDICINE

## 2023-11-09 PROCEDURE — G2066 INTER DEVC REMOTE 30D: HCPCS | Performed by: INTERNAL MEDICINE

## 2023-12-29 ENCOUNTER — ANCILLARY PROCEDURE (OUTPATIENT)
Dept: CARDIOLOGY | Facility: CLINIC | Age: 68
End: 2023-12-29
Attending: INTERNAL MEDICINE
Payer: COMMERCIAL

## 2023-12-29 ENCOUNTER — TELEPHONE (OUTPATIENT)
Dept: CARDIOLOGY | Facility: CLINIC | Age: 68
End: 2023-12-29
Payer: COMMERCIAL

## 2023-12-29 DIAGNOSIS — Z95.818 IMPLANTABLE LOOP RECORDER PRESENT: ICD-10-CM

## 2023-12-29 DIAGNOSIS — R55 SYNCOPE, UNSPECIFIED SYNCOPE TYPE: ICD-10-CM

## 2023-12-29 LAB
MDC_IDC_EPISODE_DTM: NORMAL
MDC_IDC_EPISODE_DTM: NORMAL
MDC_IDC_EPISODE_DURATION: 450 S
MDC_IDC_EPISODE_ID: NORMAL
MDC_IDC_EPISODE_ID: NORMAL
MDC_IDC_EPISODE_TYPE: NORMAL
MDC_IDC_EPISODE_TYPE: NORMAL
MDC_IDC_EPISODE_VENDOR_TYPE: NORMAL
MDC_IDC_MSMT_BATTERY_DTM: NORMAL
MDC_IDC_MSMT_BATTERY_STATUS: NORMAL
MDC_IDC_PG_IMPLANT_DTM: NORMAL
MDC_IDC_PG_MFG: NORMAL
MDC_IDC_PG_MODEL: NORMAL
MDC_IDC_PG_SERIAL: NORMAL
MDC_IDC_PG_TYPE: NORMAL
MDC_IDC_SESS_CLINIC_NAME: NORMAL
MDC_IDC_SESS_DTM: NORMAL
MDC_IDC_SESS_TYPE: NORMAL
MDC_IDC_STAT_AT_BURDEN_PERCENT: 0 %
MDC_IDC_STAT_AT_DTM_END: NORMAL
MDC_IDC_STAT_AT_DTM_START: NORMAL

## 2023-12-29 PROCEDURE — G2066 INTER DEVC REMOTE 30D: HCPCS | Performed by: INTERNAL MEDICINE

## 2023-12-29 PROCEDURE — 93298 REM INTERROG DEV EVAL SCRMS: CPT | Performed by: INTERNAL MEDICINE

## 2023-12-29 NOTE — TELEPHONE ENCOUNTER
"----- Message from Valerie Whalenec sent at 12/29/2023  6:51 AM CST -----  Regarding: device RN review  No charge. No Epic interface required.  Type: loop recorder remote transmission done for symptom triggered episode.   Presenting rhythm: Sinus 90 bpm.   Battery status: OK.  Arrhythmias: since 11/9/23; One symptom triggered episode on 12/28/23 8:50AM, records light headed. Appears to be normal sinus 70-75 bpm with occasional muscle artifact/noise.   Plan: Routed to device RN for review. HANNA Castillo, Device Specialist    -------------------------------------------------    Device transmission reviewed, agree with above report.  No significant arrhythmias or ectopy noted on rhythm strips from yesterday morning.    Call placed to patient to review transmission results.  Patient states he was on a flight to [place] with his wife when he started to feel lightheaded, like he was going to faint. \"  I started seeing stars, and gave my wife the signal to get the stewardess over for oxygen.\"  States they came over and put oxygen on him for about 30 minutes and he was fine to come around.  Does not feel that he completely lost consciousness but  was near fainting for approximately 15 to 20 minutes.    States there was a cardiologist and a paramedic on the flight so they were next to him making sure he was okay.  Patient states he has had this happen now about 12 times on flights where he nearly faints or faints completely for no known reason.  States he is taken 5 flights this year already with no issues but for some reason yesterday it happened again.    States he fears that one of the times they are going to try to divert a flight for medical reasons thinking he is having a heart attack.  He said his wife has had to convince them that he is not having a heart attack so they do not divert the flight, and to given him oxygen instead as this has helped in past.     He denies feeling dehydrated, drinks plenty of fluids and gets rest " before flights.  States he does not take anything before flights as far as medications or substances.  States at one point he was told to hold atenolol before his fight so he does delay taking it the morning of.      States he does not feel this is anxiety related either.  He denies having any fears of flying or feeling claustrophobic.  He is he asked enjoys flying and often flies as he has 2 children that live out of state. States after oxygen for about 30 minutes yesterday he felt totally fine and had no issues the rest of the day.  He does not want to be to be told he cannot fly to see them because it is too far of a drive.     States he is happy to hear there are no rhythm issues correlating with yesterday's episode on plane but also feels a bit frustrated that this continues to happen for no known reason. Wishes he had more answers.     Encouraged to continue recording/reporting episodes via Loop Recorder. Will forward to Dr. Francois/Agus for any other recommendations.     Elne Narvaez RN

## 2024-01-30 DIAGNOSIS — R55 SYNCOPE, UNSPECIFIED SYNCOPE TYPE: ICD-10-CM

## 2024-01-30 DIAGNOSIS — Z95.818 IMPLANTABLE LOOP RECORDER PRESENT: Primary | ICD-10-CM

## 2024-01-31 ENCOUNTER — OFFICE VISIT (OUTPATIENT)
Dept: CARDIOLOGY | Facility: CLINIC | Age: 69
End: 2024-01-31
Attending: INTERNAL MEDICINE
Payer: COMMERCIAL

## 2024-01-31 VITALS
RESPIRATION RATE: 16 BRPM | SYSTOLIC BLOOD PRESSURE: 130 MMHG | HEIGHT: 70 IN | BODY MASS INDEX: 35.07 KG/M2 | DIASTOLIC BLOOD PRESSURE: 80 MMHG | WEIGHT: 245 LBS | HEART RATE: 72 BPM

## 2024-01-31 DIAGNOSIS — Z95.818 IMPLANTABLE LOOP RECORDER PRESENT: ICD-10-CM

## 2024-01-31 DIAGNOSIS — R53.82 CHRONIC FATIGUE: ICD-10-CM

## 2024-01-31 PROCEDURE — 99204 OFFICE O/P NEW MOD 45 MIN: CPT | Performed by: INTERNAL MEDICINE

## 2024-01-31 NOTE — PROGRESS NOTES
[facility]  Cardiac Electrophysiology  [address]  [place] [address]   Office: [phone]  Fax: [phone]     Cardiac Electrophysiology Consultation    Patient: Jamel Noel   : 1955     Referring Provider: Johnson Goldsmith  Primary Care Provider: Yvan Vergara MD  Primary Cardiology: Omid Francois MD    CHIEF COMPLAINT/REASON FOR CONSULTATION  Syncope    Assessment/Recommendations     Syncope: Recurrent problem for patient dating back to early childhood.  Patient with most recent episode of syncope on 2024.  The patient's description of his episodes and the lack of any rhythm disturbance based on ILR review makes the diagnosis most certainly autonomic dysfunction (vasovagal) type syncope.  The episodes are largely triggered in the setting of prolonged seated position on plane flights, but he has had other instances not associated with flying.    Essential hypertension: Chronic problem for the patient and his blood pressure today at target on his current medical therapy.  Notably the patient is not on diuretic therapy.  I have instructed the patient that he should not be placed on diuretic therapy for blood pressure management as it may further worsen his autonomic dysfunction outlined above.    Implantable loop recorder: Patient status post [employer] ILR implant 2023.  Normal device function.      Follow up/recommendations:   Recommended conservative measures to try and avoid further recurrence of syncope while flying including: Increased hydration (60 ounces prior to getting on the plane), use of knee-high compression stockings, use of lower extremity active muscle contraction to assist in venous return, getting into a supine position if at all possible when his symptoms begin, avoid excessive clothing/warmth, and avoidance of alcohol.  Okay for patient to continue routine ILR follow-up and continue to report episodes.  Okay for  "follow-up with Dr. Francois                                             History of Present Illness   Jamel Noel is a 68 year old male with history of hypertension and recurrent syncope referred by Dr. Francois for consultation regarding diagnosis and treatment options for recurrent syncope.  The patient has a longstanding history of recurrent syncope.  As a youth he reports 5-6 episodes per year of syncope while in [place] until the age of 10.  The patient more recently has had a total of 12 episodes of syncope while flying.  The patient reports these episodes as initially beginning with a sensation of feeling lightheaded and dizzy.  He then begins to \"see white spots\" and becomes quite clammy and pale per the description of his wife and others.  At that point the patient oftentimes loses his ability to verbalize.  Typically the patient has roughly 2 minutes of warning prior to his syncope and is able to verbalize again within approximately 5 minutes.  Oftentimes he is treated with oxygen while on these flights.  On 1 occasion the patient had to be removed from his seat and placed supine prior to fully recovering.  On another occasion the patient began to develop his symptoms and stood to get off the plane and had syncope.  Following his events he feels quite \"hot\" and \"wiped out\" for 2 to 3 hours following his events.  The patient has not suffered any injuries.  He reports that typically he drinks between 30 and 40 ounces of fluid leading up to his plane flights.  He has no other symptoms of shortness of breath, chest discomfort, nausea, vomiting, etc.  On rare occasions including this past November the patient has episodes when he is not flying and this episode occurred while he was walking in [employer] and he had presyncope which abated without him having to sit down or lay down.       Physical Examination  Review of Systems   VITALS: /80 (BP Location: Right arm, Patient Position: Sitting, Cuff Size: Adult " "Large)   Pulse 72   Resp 16   Ht 1.778 m (5' 10\")   Wt 111.1 kg (245 lb)   BMI 35.15 kg/m      Wt Readings from Last 3 Encounters:   08/07/23 114.4 kg (252 lb 1.6 oz)   08/04/23 113.4 kg (250 lb)   07/20/23 116.6 kg (257 lb)     CONSTITUTIONAL: well nourished, comfortable, no distress  EYES:  Conjunctivae pink, sclerae clear.    E/N/T:  Oral mucosa pink  RESPIRATORY:  Respiratory effort is normal  CARDIOVASCULAR: Regular rate and rhythm with normal S1 and S2, no murmur, rub, or gallop.  GASTROINTESTINAL:  Abdomen without masses or tenderness  EXTREMITIES:  No clubbing or cyanosis.    MUSCULOSKELETAL:  Overall grossly normal muscle strength  SKIN:  Overall, skin warm and dry, no lesions.  NEURO/PSYCH:  Oriented x 3 with normal affect.   Constitutional:  No weight loss or loss of appetite    Eyes:  No difficulty with vision, no double vision, no dry eyes  ENT:  No sore throat, difficulty swallowing; changes in hearing or tinnitus  Cardiovascular: As detailed above  Respiratory:  No cough  Musculoskeletal  No joint pain, muscle aches  Neurologic:  No syncope, lightheadedness, fainting spells   Hematologic: No easy bruising, excessive bleeding tendency   Gastrointestinal:  No jaundice, abdominal pain or abdominal bloating  Genitourinary: No changes in urinary habits, no trouble urinating    Psychiatric: No anxiety or depression      Medical History  Surgical History   Past Medical History:   Diagnosis Date    Chronic sinusitis     GERD (gastroesophageal reflux disease)     Hyperlipemia     Hypertension     Lumbar spondylolysis     Peripheral neuropathy     Renal disease     Syncope, unspecified syncope type 7/20/2023    Past Surgical History:   Procedure Laterality Date    COLONOSCOPY      EP LOOP RECORDER IMPLANT N/A 4/25/2023    Procedure: Loop Recorder Implant;  Surgeon: Johnson Goldsmith MD;  Location: [facility] CV    HERNIORRHAPHY UMBILICAL N/A 9/16/2022    Procedure: HERNIORRHAPHY, UMBILICAL, OPEN;  " Surgeon: Cooper Armijo MD;  Location: [facility]    INJECT NERVE BLOCK ILIOINGUINAL Left 3/24/2023    Procedure: INJECT NERVE BLOCK ILIOINGUINAL;  Surgeon: Tony Juan MD;  Location: [facility] OR    LAPAROSCOPIC HERNIORRHAPHY INGUINAL Left 9/16/2022    Procedure: HERNIORRHAPHY, INGUINAL, LAPAROSCOPIC, HERNIORRHAPHY, UMBILICAL, OPEN;  Surgeon: Cooper Armijo MD;  Location: [facility]    NERVE BLOCK PERIPHERAL Left 8/4/2023    Procedure: BLOCK, NERVE, PERIPHERAL - Left Lateral Femoral Cutaneous Nerve Block;  Surgeon: Tony Juan MD;  Location: [facility] OR    OTHER SURGICAL HISTORY      vastectomy    OTHER SURGICAL HISTORY Bilateral 2007    carpal tunnel repair         Family History Social History   No family history on file.     Social History     Tobacco Use    Smoking status: Never    Smokeless tobacco: Never   Vaping Use    Vaping Use: Never used   Substance Use Topics    Alcohol use: Not Currently     Comment: Alcoholic Drinks/day: no drinking for 5 years    Drug use: Never         Medications  Allergies     Current Outpatient Medications:     acetaminophen (TYLENOL) 500 MG tablet, [ACETAMINOPHEN (TYLENOL) 500 MG TABLET] Take 1 tablet (500 mg total) by mouth every 4 (four) hours., Disp: , Rfl: 0    amLODIPine (NORVASC) 10 MG tablet, Take 10 mg by mouth every morning, Disp: , Rfl:     atenolol (TENORMIN) 25 MG tablet, Take 0.5 tablets (12.5 mg) by mouth daily, Disp: 45 tablet, Rfl: 1    atenolol (TENORMIN) 25 MG tablet, atenolol 25 mg tablet  TAKE 1 TABLET BY MOUTH 1 TIME EACH DAY., Disp: , Rfl:     atorvastatin (LIPITOR) 20 MG tablet, [ATORVASTATIN (LIPITOR) 20 MG TABLET] Take 20 mg by mouth at bedtime., Disp: , Rfl:     cephalexin 250 MG TABS, Take by mouth 2 times daily, Disp: , Rfl:     cloNIDine (CATAPRES) 0.1 MG tablet, Take 1 tablet (0.1 mg) by mouth 3 times daily, Disp: 21 tablet, Rfl: 0    docusate sodium (COLACE) 100 MG capsule, Take 1 capsule by mouth, Disp: , Rfl:      "gabapentin (NEURONTIN) 300 MG capsule, Take 300 mg by mouth 3 times daily, Disp: , Rfl:     loratadine (CLARITIN) 10 MG tablet, Take 1 tablet by mouth, Disp: , Rfl:     losartan (COZAAR) 100 MG tablet, losartan 100 mg tablet  TAKE 1 TABLET BY MOUTH 1 TIME EACH DAY., Disp: , Rfl:     multivitamin (CENTRUM SILVER) tablet, , Disp: , Rfl:     omeprazole (PRILOSEC) 20 MG DR capsule, Take 1 capsule by mouth, Disp: , Rfl:     vitamin B complex with vitamin C (VITAMIN  B COMPLEX) tablet, , Disp: , Rfl:     vitamin D3 (CHOLECALCIFEROL) 125 MCG (5000 UT) tablet, Take 1 tablet by mouth, Disp: , Rfl:      Allergies   Allergen Reactions    Banana Itching     Itching throat    Nuts - Unspecified [Nuts] Itching     Itching throat    Grass     Other Environmental Allergy Other (See Comments)     Grass Hayfever  Itching throat runny eyes          Lab Results    Chemistry CBC Cardiac Enzymes/BNP/TSH/INR   Recent Labs   Lab Test 10/24/23  1401      POTASSIUM 5.1   CHLORIDE 102   CO2 26   GLC 95   BUN 12.4   CR 0.93   GFRESTIMATED 89   YOAN 9.8     Recent Labs   Lab Test 10/24/23  1401 08/07/23  0916 04/21/23  1424   CR 0.93 0.83 1.08          Recent Labs   Lab Test 10/24/23  1401   WBC 6.9   HGB 16.3   HCT 47.0   MCV 92        Recent Labs   Lab Test 10/24/23  1401 08/07/23  0916   HGB 16.3 16.5    No results for input(s): \"TROPONINI\" in the last 88066 hours.  No results for input(s): \"BNP\", \"NTBNPI\", \"NTBNP\" in the last 76653 hours.  Recent Labs   Lab Test 07/20/23  0932   TSH 1.82     Recent Labs   Lab Test 10/31/23  0919   INR 1.06         Data Review    ECGs dated 1/13/2023  (tracings independently reviewed)  Tracing demonstrates sinus mechanism with rates in the mid 60s, first-degree AV block, and no other acute changes.    Implantable loop recorder monitor (implanted 4/25/2023 Pending sale to [facility])  (independently reviewed)  Interrogation 12/29/2023:  No charge. No Epic interface required.  Type: loop recorder remote transmission " done for symptom triggered episode.   Presenting rhythm: Sinus 90 bpm.   Battery status: OK.  Arrhythmias: since 11/9/23; One symptom triggered episode on 12/28/23 8:50AM, records light headed. Appears to be normal sinus 70-75 bpm with occasional muscle artifact/noise.     Stress echocardiogram 1/17/2023 ([facility])         Cc:

## 2024-01-31 NOTE — LETTER
2024    Yvan Vergara MD  [address]  [place] [address]    RE: Jamel Noel       Dear Colleague,     I had the pleasure of seeing Jamel Noel in the [facility].     [facility]  Cardiac Electrophysiology  [address]  [place] [address]   Office: [phone]  Fax: [phone]     Cardiac Electrophysiology Consultation    Patient: Jamel Noel   : 1955     Referring Provider: Johnson Goldsmith  Primary Care Provider: Yvan Vergara MD  Primary Cardiology: Omid Francois MD    CHIEF COMPLAINT/REASON FOR CONSULTATION  Syncope    Assessment/Recommendations     Syncope: Recurrent problem for patient dating back to early childhood.  Patient with most recent episode of syncope on 2024.  The patient's description of his episodes and the lack of any rhythm disturbance based on ILR review makes the diagnosis most certainly autonomic dysfunction (vasovagal) type syncope.  The episodes are largely triggered in the setting of prolonged seated position on plane flights, but he has had other instances not associated with flying.    Essential hypertension: Chronic problem for the patient and his blood pressure today at target on his current medical therapy.  Notably the patient is not on diuretic therapy.  I have instructed the patient that he should not be placed on diuretic therapy for blood pressure management as it may further worsen his autonomic dysfunction outlined above.    Implantable loop recorder: Patient status post [employer] ILR implant 2023.  Normal device function.      Follow up/recommendations:   Recommended conservative measures to try and avoid further recurrence of syncope while flying including: Increased hydration (60 ounces prior to getting on the plane), use of knee-high compression stockings, use of lower extremity active muscle contraction to assist in venous return, getting into a  "supine position if at all possible when his symptoms begin, avoid excessive clothing/warmth, and avoidance of alcohol.  Okay for patient to continue routine ILR follow-up and continue to report episodes.  Okay for follow-up with Dr. Francois                                             History of Present Illness   Jamel Noel is a 68 year old male with history of hypertension and recurrent syncope referred by Dr. Francois for consultation regarding diagnosis and treatment options for recurrent syncope.  The patient has a longstanding history of recurrent syncope.  As a youth he reports 5-6 episodes per year of syncope while in [place] until the age of 10.  The patient more recently has had a total of 12 episodes of syncope while flying.  The patient reports these episodes as initially beginning with a sensation of feeling lightheaded and dizzy.  He then begins to \"see white spots\" and becomes quite clammy and pale per the description of his wife and others.  At that point the patient oftentimes loses his ability to verbalize.  Typically the patient has roughly 2 minutes of warning prior to his syncope and is able to verbalize again within approximately 5 minutes.  Oftentimes he is treated with oxygen while on these flights.  On 1 occasion the patient had to be removed from his seat and placed supine prior to fully recovering.  On another occasion the patient began to develop his symptoms and stood to get off the plane and had syncope.  Following his events he feels quite \"hot\" and \"wiped out\" for 2 to 3 hours following his events.  The patient has not suffered any injuries.  He reports that typically he drinks between 30 and 40 ounces of fluid leading up to his plane flights.  He has no other symptoms of shortness of breath, chest discomfort, nausea, vomiting, etc.  On rare occasions including this past November the patient has episodes when he is not flying and this episode occurred while he was walking in [employer] " "and he had presyncope which abated without him having to sit down or lay down.       Physical Examination  Review of Systems   VITALS: /80 (BP Location: Right arm, Patient Position: Sitting, Cuff Size: Adult Large)   Pulse 72   Resp 16   Ht 1.778 m (5' 10\")   Wt 111.1 kg (245 lb)   BMI 35.15 kg/m      Wt Readings from Last 3 Encounters:   08/07/23 114.4 kg (252 lb 1.6 oz)   08/04/23 113.4 kg (250 lb)   07/20/23 116.6 kg (257 lb)     CONSTITUTIONAL: well nourished, comfortable, no distress  EYES:  Conjunctivae pink, sclerae clear.    E/N/T:  Oral mucosa pink  RESPIRATORY:  Respiratory effort is normal  CARDIOVASCULAR: Regular rate and rhythm with normal S1 and S2, no murmur, rub, or gallop.  GASTROINTESTINAL:  Abdomen without masses or tenderness  EXTREMITIES:  No clubbing or cyanosis.    MUSCULOSKELETAL:  Overall grossly normal muscle strength  SKIN:  Overall, skin warm and dry, no lesions.  NEURO/PSYCH:  Oriented x 3 with normal affect.   Constitutional:  No weight loss or loss of appetite    Eyes:  No difficulty with vision, no double vision, no dry eyes  ENT:  No sore throat, difficulty swallowing; changes in hearing or tinnitus  Cardiovascular: As detailed above  Respiratory:  No cough  Musculoskeletal  No joint pain, muscle aches  Neurologic:  No syncope, lightheadedness, fainting spells   Hematologic: No easy bruising, excessive bleeding tendency   Gastrointestinal:  No jaundice, abdominal pain or abdominal bloating  Genitourinary: No changes in urinary habits, no trouble urinating    Psychiatric: No anxiety or depression      Medical History  Surgical History   Past Medical History:   Diagnosis Date    Chronic sinusitis     GERD (gastroesophageal reflux disease)     Hyperlipemia     Hypertension     Lumbar spondylolysis     Peripheral neuropathy     Renal disease     Syncope, unspecified syncope type 7/20/2023    Past Surgical History:   Procedure Laterality Date    COLONOSCOPY      EP LOOP " RECORDER IMPLANT N/A 4/25/2023    Procedure: Loop Recorder Implant;  Surgeon: Johnson Goldsmith MD;  Location: [facility] CV    HERNIORRHAPHY UMBILICAL N/A 9/16/2022    Procedure: HERNIORRHAPHY, UMBILICAL, OPEN;  Surgeon: Cooper Armijo MD;  Location: [facility]    INJECT NERVE BLOCK ILIOINGUINAL Left 3/24/2023    Procedure: INJECT NERVE BLOCK ILIOINGUINAL;  Surgeon: Tony Juan MD;  Location: [facility] OR    LAPAROSCOPIC HERNIORRHAPHY INGUINAL Left 9/16/2022    Procedure: HERNIORRHAPHY, INGUINAL, LAPAROSCOPIC, HERNIORRHAPHY, UMBILICAL, OPEN;  Surgeon: Cooper Armijo MD;  Location: [facility]    NERVE BLOCK PERIPHERAL Left 8/4/2023    Procedure: BLOCK, NERVE, PERIPHERAL - Left Lateral Femoral Cutaneous Nerve Block;  Surgeon: Tony Juan MD;  Location: [facility] OR    OTHER SURGICAL HISTORY      vastectomy    OTHER SURGICAL HISTORY Bilateral 2007    carpal tunnel repair         Family History Social History   No family history on file.     Social History     Tobacco Use    Smoking status: Never    Smokeless tobacco: Never   Vaping Use    Vaping Use: Never used   Substance Use Topics    Alcohol use: Not Currently     Comment: Alcoholic Drinks/day: no drinking for 5 years    Drug use: Never         Medications  Allergies     Current Outpatient Medications:     acetaminophen (TYLENOL) 500 MG tablet, [ACETAMINOPHEN (TYLENOL) 500 MG TABLET] Take 1 tablet (500 mg total) by mouth every 4 (four) hours., Disp: , Rfl: 0    amLODIPine (NORVASC) 10 MG tablet, Take 10 mg by mouth every morning, Disp: , Rfl:     atenolol (TENORMIN) 25 MG tablet, Take 0.5 tablets (12.5 mg) by mouth daily, Disp: 45 tablet, Rfl: 1    atenolol (TENORMIN) 25 MG tablet, atenolol 25 mg tablet  TAKE 1 TABLET BY MOUTH 1 TIME EACH DAY., Disp: , Rfl:     atorvastatin (LIPITOR) 20 MG tablet, [ATORVASTATIN (LIPITOR) 20 MG TABLET] Take 20 mg by mouth at bedtime., Disp: , Rfl:     cephalexin 250 MG TABS, Take by mouth 2 times daily,  "Disp: , Rfl:     cloNIDine (CATAPRES) 0.1 MG tablet, Take 1 tablet (0.1 mg) by mouth 3 times daily, Disp: 21 tablet, Rfl: 0    docusate sodium (COLACE) 100 MG capsule, Take 1 capsule by mouth, Disp: , Rfl:     gabapentin (NEURONTIN) 300 MG capsule, Take 300 mg by mouth 3 times daily, Disp: , Rfl:     loratadine (CLARITIN) 10 MG tablet, Take 1 tablet by mouth, Disp: , Rfl:     losartan (COZAAR) 100 MG tablet, losartan 100 mg tablet  TAKE 1 TABLET BY MOUTH 1 TIME EACH DAY., Disp: , Rfl:     multivitamin (CENTRUM SILVER) tablet, , Disp: , Rfl:     omeprazole (PRILOSEC) 20 MG DR capsule, Take 1 capsule by mouth, Disp: , Rfl:     vitamin B complex with vitamin C (VITAMIN  B COMPLEX) tablet, , Disp: , Rfl:     vitamin D3 (CHOLECALCIFEROL) 125 MCG (5000 UT) tablet, Take 1 tablet by mouth, Disp: , Rfl:      Allergies   Allergen Reactions    Banana Itching     Itching throat    Nuts - Unspecified [Nuts] Itching     Itching throat    Grass     Other Environmental Allergy Other (See Comments)     Grass Hayfever  Itching throat runny eyes          Lab Results    Chemistry CBC Cardiac Enzymes/BNP/TSH/INR   Recent Labs   Lab Test 10/24/23  1401      POTASSIUM 5.1   CHLORIDE 102   CO2 26   GLC 95   BUN 12.4   CR 0.93   GFRESTIMATED 89   YOAN 9.8     Recent Labs   Lab Test 10/24/23  1401 08/07/23  0916 04/21/23  1424   CR 0.93 0.83 1.08          Recent Labs   Lab Test 10/24/23  1401   WBC 6.9   HGB 16.3   HCT 47.0   MCV 92        Recent Labs   Lab Test 10/24/23  1401 08/07/23  0916   HGB 16.3 16.5    No results for input(s): \"TROPONINI\" in the last 51633 hours.  No results for input(s): \"BNP\", \"NTBNPI\", \"NTBNP\" in the last 71195 hours.  Recent Labs   Lab Test 07/20/23  0932   TSH 1.82     Recent Labs   Lab Test 10/31/23  0919   INR 1.06         Data Review    ECGs dated 1/13/2023  (tracings independently reviewed)  Tracing demonstrates sinus mechanism with rates in the mid 60s, first-degree AV block, and no other acute " changes.    Implantable loop recorder monitor (implanted 4/25/2023 [facility])  (independently reviewed)  Interrogation 12/29/2023:  No charge. No Epic interface required.  Type: loop recorder remote transmission done for symptom triggered episode.   Presenting rhythm: Sinus 90 bpm.   Battery status: OK.  Arrhythmias: since 11/9/23; One symptom triggered episode on 12/28/23 8:50AM, records light headed. Appears to be normal sinus 70-75 bpm with occasional muscle artifact/noise.     Stress echocardiogram 1/17/2023 ([facility])         Cc:         Thank you for allowing me to participate in the care of your patient.      Sincerely,     Johnson Goldsmith MD     [facility]  cc:   Johnson Goldsmith MD  [address]  [place] [address]

## 2024-05-01 ENCOUNTER — ANCILLARY PROCEDURE (OUTPATIENT)
Dept: CARDIOLOGY | Facility: CLINIC | Age: 69
End: 2024-05-01
Attending: INTERNAL MEDICINE
Payer: COMMERCIAL

## 2024-05-01 DIAGNOSIS — R55 SYNCOPE, UNSPECIFIED SYNCOPE TYPE: ICD-10-CM

## 2024-05-01 DIAGNOSIS — Z95.818 IMPLANTABLE LOOP RECORDER PRESENT: ICD-10-CM

## 2024-05-01 LAB
MDC_IDC_EPISODE_DTM: NORMAL
MDC_IDC_EPISODE_ID: NORMAL
MDC_IDC_EPISODE_TYPE: NORMAL
MDC_IDC_MSMT_BATTERY_DTM: NORMAL
MDC_IDC_MSMT_BATTERY_STATUS: NORMAL
MDC_IDC_PG_IMPLANT_DTM: NORMAL
MDC_IDC_PG_MFG: NORMAL
MDC_IDC_PG_MODEL: NORMAL
MDC_IDC_PG_SERIAL: NORMAL
MDC_IDC_PG_TYPE: NORMAL
MDC_IDC_SESS_CLINIC_NAME: NORMAL
MDC_IDC_SESS_DTM: NORMAL
MDC_IDC_SESS_TYPE: NORMAL
MDC_IDC_SET_ZONE_TYPE: NORMAL
MDC_IDC_STAT_AT_BURDEN_PERCENT: 0 %
MDC_IDC_STAT_AT_DTM_END: NORMAL
MDC_IDC_STAT_AT_DTM_START: NORMAL

## 2024-05-01 PROCEDURE — 93298 REM INTERROG DEV EVAL SCRMS: CPT | Performed by: INTERNAL MEDICINE

## 2024-06-29 ENCOUNTER — HEALTH MAINTENANCE LETTER (OUTPATIENT)
Age: 69
End: 2024-06-29

## 2024-08-04 DIAGNOSIS — R53.82 CHRONIC FATIGUE: ICD-10-CM

## 2024-08-04 DIAGNOSIS — Z95.818 IMPLANTABLE LOOP RECORDER PRESENT: ICD-10-CM

## 2024-08-06 RX ORDER — ATENOLOL 25 MG/1
12.5 TABLET ORAL DAILY
Qty: 45 TABLET | Refills: 0 | Status: SHIPPED | OUTPATIENT
Start: 2024-08-06

## 2024-08-13 ENCOUNTER — ANCILLARY PROCEDURE (OUTPATIENT)
Dept: CARDIOLOGY | Facility: CLINIC | Age: 69
End: 2024-08-13
Attending: INTERNAL MEDICINE
Payer: COMMERCIAL

## 2024-08-13 DIAGNOSIS — Z95.818 IMPLANTABLE LOOP RECORDER PRESENT: ICD-10-CM

## 2024-08-13 DIAGNOSIS — R55 SYNCOPE, UNSPECIFIED SYNCOPE TYPE: ICD-10-CM

## 2024-08-13 LAB
MDC_IDC_EPISODE_DTM: NORMAL
MDC_IDC_EPISODE_ID: NORMAL
MDC_IDC_EPISODE_TYPE: NORMAL
MDC_IDC_MSMT_BATTERY_DTM: NORMAL
MDC_IDC_MSMT_BATTERY_STATUS: NORMAL
MDC_IDC_PG_IMPLANT_DTM: NORMAL
MDC_IDC_PG_MFG: NORMAL
MDC_IDC_PG_MODEL: NORMAL
MDC_IDC_PG_SERIAL: NORMAL
MDC_IDC_PG_TYPE: NORMAL
MDC_IDC_SESS_CLINIC_NAME: NORMAL
MDC_IDC_SESS_DTM: NORMAL
MDC_IDC_SESS_TYPE: NORMAL
MDC_IDC_SET_ZONE_TYPE: NORMAL
MDC_IDC_SET_ZONE_VENDOR_TYPE: NORMAL
MDC_IDC_STAT_AT_BURDEN_PERCENT: 0 %
MDC_IDC_STAT_AT_DTM_END: NORMAL
MDC_IDC_STAT_AT_DTM_START: NORMAL

## 2024-08-13 PROCEDURE — 93298 REM INTERROG DEV EVAL SCRMS: CPT | Performed by: INTERNAL MEDICINE

## 2024-08-28 ENCOUNTER — TELEPHONE (OUTPATIENT)
Dept: CARDIOLOGY | Facility: CLINIC | Age: 69
End: 2024-08-28

## 2024-08-28 ENCOUNTER — ANCILLARY PROCEDURE (OUTPATIENT)
Dept: CARDIOLOGY | Facility: CLINIC | Age: 69
End: 2024-08-28
Attending: INTERNAL MEDICINE
Payer: COMMERCIAL

## 2024-08-28 DIAGNOSIS — Z95.818 IMPLANTABLE LOOP RECORDER PRESENT: ICD-10-CM

## 2024-08-28 DIAGNOSIS — R55 SYNCOPE, UNSPECIFIED SYNCOPE TYPE: ICD-10-CM

## 2024-08-28 LAB
MDC_IDC_EPISODE_DTM: NORMAL
MDC_IDC_EPISODE_DTM: NORMAL
MDC_IDC_EPISODE_DURATION: 450 S
MDC_IDC_EPISODE_ID: NORMAL
MDC_IDC_EPISODE_ID: NORMAL
MDC_IDC_EPISODE_TYPE: NORMAL
MDC_IDC_EPISODE_TYPE: NORMAL
MDC_IDC_EPISODE_VENDOR_TYPE: NORMAL
MDC_IDC_MSMT_BATTERY_DTM: NORMAL
MDC_IDC_MSMT_BATTERY_STATUS: NORMAL
MDC_IDC_PG_IMPLANT_DTM: NORMAL
MDC_IDC_PG_MFG: NORMAL
MDC_IDC_PG_MODEL: NORMAL
MDC_IDC_PG_SERIAL: NORMAL
MDC_IDC_PG_TYPE: NORMAL
MDC_IDC_SESS_CLINIC_NAME: NORMAL
MDC_IDC_SESS_DTM: NORMAL
MDC_IDC_SESS_TYPE: NORMAL
MDC_IDC_SET_ZONE_TYPE: NORMAL
MDC_IDC_STAT_AT_BURDEN_PERCENT: 0 %
MDC_IDC_STAT_AT_DTM_END: NORMAL
MDC_IDC_STAT_AT_DTM_START: NORMAL

## 2024-08-28 NOTE — TELEPHONE ENCOUNTER
"Type: alert remote loop recorder transmission for symptom episode. Courtesy check.   Device: [employer] LUX-Dx ICM.  Presenting rhythm: Sinus 60 bpm.   Battery status: OK  Arrhythmias: since 8/13/24; One symptom triggered episode on 8/27/24 10:34PM, recording appears to be sinus bradycardia with rates down to 46 bpm. Occasional noise noted throughout.   plan: Routed to device RN for review. HANNA Castillo, Device Specialist  ADD: Transmission reviewed, see EPIC for details. Sveltana Stratton RN          8/28/24: Reviewed last evenings symptom trigger episode with Jamel. Patient reported that his \"back stenosis\" has been acting up lately. He reports that last evening his back pain came on sudden along with a pain in his chest, sweaty, and clammy. He took a couple blood pressures that read 80/40 and 70/30. He said he almost went to the ER but symptoms resolved after about 1.5 hours after taking an extra Gabapentin. Reviewed that the loop recorder didn't find any heart rate and heart rhythm issue, but instructed him to follow up with his cardiologist to review chest pain and low blood pressures. Transferred to scheduling and patient scheduled with Dr. Francois 9/3/24. Will route to Dr. Francois to review. Svetlana Stratton RN  "

## 2024-09-03 ENCOUNTER — OFFICE VISIT (OUTPATIENT)
Dept: CARDIOLOGY | Facility: CLINIC | Age: 69
End: 2024-09-03
Payer: COMMERCIAL

## 2024-09-03 VITALS
WEIGHT: 248 LBS | SYSTOLIC BLOOD PRESSURE: 155 MMHG | DIASTOLIC BLOOD PRESSURE: 91 MMHG | BODY MASS INDEX: 35.58 KG/M2 | HEART RATE: 65 BPM | RESPIRATION RATE: 14 BRPM

## 2024-09-03 DIAGNOSIS — R07.2 PRECORDIAL PAIN: ICD-10-CM

## 2024-09-03 DIAGNOSIS — I15.8 OTHER SECONDARY HYPERTENSION: Primary | ICD-10-CM

## 2024-09-03 DIAGNOSIS — R55 SYNCOPE, UNSPECIFIED SYNCOPE TYPE: ICD-10-CM

## 2024-09-03 DIAGNOSIS — E78.5 HYPERLIPIDEMIA LDL GOAL <70: ICD-10-CM

## 2024-09-03 DIAGNOSIS — R53.82 CHRONIC FATIGUE: ICD-10-CM

## 2024-09-03 DIAGNOSIS — Z95.818 IMPLANTABLE LOOP RECORDER PRESENT: ICD-10-CM

## 2024-09-03 LAB
ATRIAL RATE - MUSE: 62 BPM
DIASTOLIC BLOOD PRESSURE - MUSE: NORMAL MMHG
INTERPRETATION ECG - MUSE: NORMAL
P AXIS - MUSE: 37 DEGREES
PR INTERVAL - MUSE: 240 MS
QRS DURATION - MUSE: 96 MS
QT - MUSE: 404 MS
QTC - MUSE: 410 MS
R AXIS - MUSE: -20 DEGREES
SYSTOLIC BLOOD PRESSURE - MUSE: NORMAL MMHG
T AXIS - MUSE: 31 DEGREES
VENTRICULAR RATE- MUSE: 62 BPM

## 2024-09-03 PROCEDURE — 99215 OFFICE O/P EST HI 40 MIN: CPT | Performed by: INTERNAL MEDICINE

## 2024-09-03 PROCEDURE — 93000 ELECTROCARDIOGRAM COMPLETE: CPT | Performed by: INTERNAL MEDICINE

## 2024-09-03 NOTE — LETTER
9/3/2024    Yvan Vergara MD  [address]  [place] [address]    RE: Jamel Noel       Dear Colleague,     I had the pleasure of seeing Jamel Noel in the [facility].      [facility]  [phone]          Assessment/Recommendations   Patient with known autonomic dysfunction with a history of multiple syncopal episodes most of which happen while flying on an airplane.  This is markedly improved and he drinks 60 ounces of fluid prior to flying, uses compression stockings, and some drops that are natural and he is preflight fluids.  The drops were recommended by his son who is an EMT and is not sure what is in them but he will send a photo of the ingredients to me.    The patient also has spinal stenosis and we will get back pain in the last episode 9 days ago was associated with chest pain which was fairly intense, associate with nausea and diaphoresis.  It lasted about an hour and a half.  They were contemplating going to the emergency room but when it went away decided against it.    I am concerned that the recent episode could certainly be a cardiac event.  Will check a twelve-lead EKG today.  If the twelve-lead is unremarkable, we will get a pharmacologic nuclear study to look for previous myocardial injury and any evidence of myocardial ischemia.  Patient is agreeable.    Blood pressure elevated today but at home has been in the 120s consistently.  Resisted the temptation to change his medication.       History of Present Illness/Subjective    Mr. Jamle Noel is a 69 year old male with known autonomic dysfunction as noted above.  About 9 days ago he had some intense back discomfort which he gets periodically with his spinal stenosis.  He has a ways that he can get rid of it but this was a particularly severe episode and also associated with chest pain which he has never had with the back discomfort.  The chest pain was squeezing, and intense.  He  also felt shortness of breath with it and diaphoresis.  He had some pain in his right arm which she typically gets when his back flares up and the whole episode lasted about an hour and a half.  He also had some nausea with it.  After the back and chest discomfort went away he felt reasonably good and has not had any recurrence.  Blood pressure was quite low initially with this episode measuring 74 systolic on his home monitor and a few minutes later was up to 84 and in the morning was back to normal.    He denies orthopnea, paroxysmal nocturnal dyspnea but does use his CPAP mask regularly.  He does not believe he has peripheral edema although as noted below on physical exam he does have mild peripheral edema.  No chest discomfort with physical activity.    LDL in late 2023 was 58    ECG: Personally reviewed.  Sinus rhythm at 62 bpm with first-degree AV block but otherwise normal.       Physical Examination Review of Systems   BP (!) 155/91 (BP Location: Right arm, Patient Position: Sitting, Cuff Size: Adult Large)   Pulse 65   Resp 14   Wt 112.5 kg (248 lb)   BMI 35.58 kg/m    Body mass index is 35.58 kg/m .  Wt Readings from Last 3 Encounters:   09/03/24 112.5 kg (248 lb)   01/31/24 111.1 kg (245 lb)   08/07/23 114.4 kg (252 lb 1.6 oz)     General Appearance:   Alert, cooperative and in no acute distress.   ENT/Mouth: Pink/moist oral mucosa   EYES:  no scleral icterus, normal conjunctivae   Neck: JVP normal. No Hepatojugular reflux. Thyroid not visualized.   Chest/Lungs:   Lungs are clear to auscultation, equal chest wall expansion.   Cardiovascular:   S1, S2 without murmur ,clicks or rubs. Brachial, radial pulses are intact and symetric. No carotid bruits noted   Abdomen:  Nontender. BS+. No bruits.   Extremities: No cyanosis, clubbing or edema   Skin: no xanthelasma, warm.    Neurologic: normal arm movement bilateral, no tremors     Psychiatric: Appropriate affect.      Enc Vitals  BP: (!) 155/91  Pulse:  65  Resp: 14  Weight: 112.5 kg (248 lb)                                           Medical History  Surgical History Family History Social History   Past Medical History:   Diagnosis Date     Chronic sinusitis      GERD (gastroesophageal reflux disease)      Hyperlipemia      Hypertension      Lumbar spondylolysis      Peripheral neuropathy      Renal disease      Syncope, unspecified syncope type 7/20/2023    Past Surgical History:   Procedure Laterality Date     COLONOSCOPY       EP LOOP RECORDER IMPLANT N/A 4/25/2023    Procedure: Loop Recorder Implant;  Surgeon: Johnson Goldsmith MD;  Location: [facility] LAB CV     HERNIORRHAPHY UMBILICAL N/A 9/16/2022    Procedure: HERNIORRHAPHY, UMBILICAL, OPEN;  Surgeon: Cooper Armijo MD;  Location: [facility]     INJECT NERVE BLOCK ILIOINGUINAL Left 3/24/2023    Procedure: INJECT NERVE BLOCK ILIOINGUINAL;  Surgeon: Tony Juan MD;  Location: [facility] OR     LAPAROSCOPIC HERNIORRHAPHY INGUINAL Left 9/16/2022    Procedure: HERNIORRHAPHY, INGUINAL, LAPAROSCOPIC, HERNIORRHAPHY, UMBILICAL, OPEN;  Surgeon: Cooper Armijo MD;  Location: [facility]     NERVE BLOCK PERIPHERAL Left 8/4/2023    Procedure: BLOCK, NERVE, PERIPHERAL - Left Lateral Femoral Cutaneous Nerve Block;  Surgeon: Tony Juan MD;  Location: [facility] OR     OTHER SURGICAL HISTORY      vastectomy     OTHER SURGICAL HISTORY Bilateral 2007    carpal tunnel repair    No family history on file. Social History     Socioeconomic History     Marital status:      Spouse name: Not on file     Number of children: Not on file     Years of education: Not on file     Highest education level: Not on file   Occupational History     Not on file   Tobacco Use     Smoking status: Never     Smokeless tobacco: Never   Vaping Use     Vaping status: Never Used   Substance and Sexual Activity     Alcohol use: Not Currently     Comment: Alcoholic Drinks/day: no drinking for 5 years     Drug use: Never      Sexual activity: Not Currently   Other Topics Concern     Not on file   Social History Narrative     Not on file     Social Determinants of Health     Financial Resource Strain: Not on file   Food Insecurity: Not on file   Transportation Needs: Not on file   Physical Activity: Not on file   Stress: Not on file   Social Connections: Unknown (4/14/2022)    Received from [facility], [facility]    Social Connections      Frequency of Communication with Friends and Family: Not on file   Interpersonal Safety: Not on file   Housing Stability: Not on file          Medications  Allergies   Current Outpatient Medications   Medication Sig Dispense Refill     acetaminophen (TYLENOL) 500 MG tablet [ACETAMINOPHEN (TYLENOL) 500 MG TABLET] Take 1 tablet (500 mg total) by mouth every 4 (four) hours.  0     atenolol (TENORMIN) 25 MG tablet TAKE 1/2 TABLET BY MOUTH DAILY 45 tablet 0     atorvastatin (LIPITOR) 20 MG tablet [ATORVASTATIN (LIPITOR) 20 MG TABLET] Take 20 mg by mouth at bedtime.       docusate sodium (COLACE) 100 MG capsule Take 1 capsule by mouth       gabapentin (NEURONTIN) 300 MG capsule Take 300 mg by mouth 3 times daily       loratadine (CLARITIN) 10 MG tablet Take 1 tablet by mouth       losartan (COZAAR) 100 MG tablet losartan 100 mg tablet   TAKE 1 TABLET BY MOUTH 1 TIME EACH DAY.       multivitamin (CENTRUM SILVER) tablet        omeprazole (PRILOSEC) 20 MG DR capsule Take 1 capsule by mouth       vitamin B complex with vitamin C (VITAMIN  B COMPLEX) tablet        vitamin D3 (CHOLECALCIFEROL) 125 MCG (5000 UT) tablet Take 1 tablet by mouth      Allergies   Allergen Reactions     Banana Itching     Itching throat     Nuts - Unspecified [Nuts] Itching     Itching throat     Grass      Other Environmental Allergy Other (See Comments)     Grass Hayfever  Itching throat runny eyes         Lab Results    Chemistry/lipid CBC Cardiac Enzymes/BNP/TSH/INR    Lab Results   Component Value Date    CHOL 126 10/24/2023    HDL 32 (L) 10/24/2023    TRIG 178 (H) 10/24/2023    BUN 12.4 10/24/2023     10/24/2023    CO2 26 10/24/2023    Lab Results   Component Value Date    WBC 6.9 10/24/2023    HGB 16.3 10/24/2023    HCT 47.0 10/24/2023    MCV 92 10/24/2023     10/24/2023    Lab Results   Component Value Date    TSH 1.82 07/20/2023    INR 1.06 10/31/2023                                                Thank you for allowing me to participate in the care of your patient.      Sincerely,     Omid Francois MD     [facility]  cc:   Omid Francois MD  [address]  [place] [address]

## 2024-09-03 NOTE — PROGRESS NOTES
[facility]  [phone]          Assessment/Recommendations   Patient with known autonomic dysfunction with a history of multiple syncopal episodes most of which happen while flying on an airplane.  This is markedly improved and he drinks 60 ounces of fluid prior to flying, uses compression stockings, and some drops that are natural and he is preflight fluids.  The drops were recommended by his son who is an EMT and is not sure what is in them but he will send a photo of the ingredients to me.    The patient also has spinal stenosis and we will get back pain in the last episode 9 days ago was associated with chest pain which was fairly intense, associate with nausea and diaphoresis.  It lasted about an hour and a half.  They were contemplating going to the emergency room but when it went away decided against it.    I am concerned that the recent episode could certainly be a cardiac event.  Will check a twelve-lead EKG today.  If the twelve-lead is unremarkable, we will get a pharmacologic nuclear study to look for previous myocardial injury and any evidence of myocardial ischemia.  Patient is agreeable.    Blood pressure elevated today but at home has been in the 120s consistently.  Resisted the temptation to change his medication.       History of Present Illness/Subjective    Mr. Jamel Noel is a 69 year old male with known autonomic dysfunction as noted above.  About 9 days ago he had some intense back discomfort which he gets periodically with his spinal stenosis.  He has a ways that he can get rid of it but this was a particularly severe episode and also associated with chest pain which he has never had with the back discomfort.  The chest pain was squeezing, and intense.  He also felt shortness of breath with it and diaphoresis.  He had some pain in his right arm which she typically gets when his back flares up and the whole episode lasted about an hour and a half.  He also had some  nausea with it.  After the back and chest discomfort went away he felt reasonably good and has not had any recurrence.  Blood pressure was quite low initially with this episode measuring 74 systolic on his home monitor and a few minutes later was up to 84 and in the morning was back to normal.    He denies orthopnea, paroxysmal nocturnal dyspnea but does use his CPAP mask regularly.  He does not believe he has peripheral edema although as noted below on physical exam he does have mild peripheral edema.  No chest discomfort with physical activity.    LDL in late 2023 was 58    ECG: Personally reviewed.  Sinus rhythm at 62 bpm with first-degree AV block but otherwise normal.       Physical Examination Review of Systems   BP (!) 155/91 (BP Location: Right arm, Patient Position: Sitting, Cuff Size: Adult Large)   Pulse 65   Resp 14   Wt 112.5 kg (248 lb)   BMI 35.58 kg/m    Body mass index is 35.58 kg/m .  Wt Readings from Last 3 Encounters:   09/03/24 112.5 kg (248 lb)   01/31/24 111.1 kg (245 lb)   08/07/23 114.4 kg (252 lb 1.6 oz)     General Appearance:   Alert, cooperative and in no acute distress.   ENT/Mouth: Pink/moist oral mucosa   EYES:  no scleral icterus, normal conjunctivae   Neck: JVP normal. No Hepatojugular reflux. Thyroid not visualized.   Chest/Lungs:   Lungs are clear to auscultation, equal chest wall expansion.   Cardiovascular:   S1, S2 without murmur ,clicks or rubs. Brachial, radial pulses are intact and symetric. No carotid bruits noted   Abdomen:  Nontender. BS+. No bruits.   Extremities: No cyanosis, clubbing or edema   Skin: no xanthelasma, warm.    Neurologic: normal arm movement bilateral, no tremors     Psychiatric: Appropriate affect.      Enc Vitals  BP: (!) 155/91  Pulse: 65  Resp: 14  Weight: 112.5 kg (248 lb)                                           Medical History  Surgical History Family History Social History   Past Medical History:   Diagnosis Date    Chronic sinusitis      GERD (gastroesophageal reflux disease)     Hyperlipemia     Hypertension     Lumbar spondylolysis     Peripheral neuropathy     Renal disease     Syncope, unspecified syncope type 7/20/2023    Past Surgical History:   Procedure Laterality Date    COLONOSCOPY      EP LOOP RECORDER IMPLANT N/A 4/25/2023    Procedure: Loop Recorder Implant;  Surgeon: Johnson Goldsmith MD;  Location: [facility] LAB CV    HERNIORRHAPHY UMBILICAL N/A 9/16/2022    Procedure: HERNIORRHAPHY, UMBILICAL, OPEN;  Surgeon: Cooper Armijo MD;  Location: [facility]    INJECT NERVE BLOCK ILIOINGUINAL Left 3/24/2023    Procedure: INJECT NERVE BLOCK ILIOINGUINAL;  Surgeon: Tony Juan MD;  Location: [facility] OR    LAPAROSCOPIC HERNIORRHAPHY INGUINAL Left 9/16/2022    Procedure: HERNIORRHAPHY, INGUINAL, LAPAROSCOPIC, HERNIORRHAPHY, UMBILICAL, OPEN;  Surgeon: Cooper Armijo MD;  Location: [facility]    NERVE BLOCK PERIPHERAL Left 8/4/2023    Procedure: BLOCK, NERVE, PERIPHERAL - Left Lateral Femoral Cutaneous Nerve Block;  Surgeon: Tony Juan MD;  Location: [facility] OR    OTHER SURGICAL HISTORY      vastectomy    OTHER SURGICAL HISTORY Bilateral 2007    carpal tunnel repair    No family history on file. Social History     Socioeconomic History    Marital status:      Spouse name: Not on file    Number of children: Not on file    Years of education: Not on file    Highest education level: Not on file   Occupational History    Not on file   Tobacco Use    Smoking status: Never    Smokeless tobacco: Never   Vaping Use    Vaping status: Never Used   Substance and Sexual Activity    Alcohol use: Not Currently     Comment: Alcoholic Drinks/day: no drinking for 5 years    Drug use: Never    Sexual activity: Not Currently   Other Topics Concern    Not on file   Social History Narrative    Not on file     Social Determinants of Health     Financial Resource Strain: Not on file   Food Insecurity: Not on file   Transportation  Needs: Not on file   Physical Activity: Not on file   Stress: Not on file   Social Connections: Unknown (4/14/2022)    Received [facility], [facility]    Social Connections     Frequency of Communication with Friends and Family: Not on file   Interpersonal Safety: Not on file   Housing Stability: Not on file          Medications  Allergies   Current Outpatient Medications   Medication Sig Dispense Refill    acetaminophen (TYLENOL) 500 MG tablet [ACETAMINOPHEN (TYLENOL) 500 MG TABLET] Take 1 tablet (500 mg total) by mouth every 4 (four) hours.  0    atenolol (TENORMIN) 25 MG tablet TAKE 1/2 TABLET BY MOUTH DAILY 45 tablet 0    atorvastatin (LIPITOR) 20 MG tablet [ATORVASTATIN (LIPITOR) 20 MG TABLET] Take 20 mg by mouth at bedtime.      docusate sodium (COLACE) 100 MG capsule Take 1 capsule by mouth      gabapentin (NEURONTIN) 300 MG capsule Take 300 mg by mouth 3 times daily      loratadine (CLARITIN) 10 MG tablet Take 1 tablet by mouth      losartan (COZAAR) 100 MG tablet losartan 100 mg tablet   TAKE 1 TABLET BY MOUTH 1 TIME EACH DAY.      multivitamin (CENTRUM SILVER) tablet       omeprazole (PRILOSEC) 20 MG DR capsule Take 1 capsule by mouth      vitamin B complex with vitamin C (VITAMIN  B COMPLEX) tablet       vitamin D3 (CHOLECALCIFEROL) 125 MCG (5000 UT) tablet Take 1 tablet by mouth      Allergies   Allergen Reactions    Banana Itching     Itching throat    Nuts - Unspecified [Nuts] Itching     Itching throat    Grass     Other Environmental Allergy Other (See Comments)     Grass Hayfever  Itching throat runny eyes         Lab Results    Chemistry/lipid CBC Cardiac Enzymes/BNP/TSH/INR   Lab Results   Component Value Date    CHOL 126 10/24/2023    HDL 32 (L) 10/24/2023    TRIG 178 (H) 10/24/2023    BUN 12.4 10/24/2023     10/24/2023    CO2 26 10/24/2023    Lab Results   Component Value Date    WBC 6.9 10/24/2023    HGB 16.3  10/24/2023    HCT 47.0 10/24/2023    MCV 92 10/24/2023     10/24/2023    Lab Results   Component Value Date    TSH 1.82 07/20/2023    INR 1.06 10/31/2023

## 2024-09-04 ENCOUNTER — MYC MEDICAL ADVICE (OUTPATIENT)
Dept: CARDIOLOGY | Facility: CLINIC | Age: 69
End: 2024-09-04
Payer: COMMERCIAL

## 2024-09-05 ENCOUNTER — HOSPITAL ENCOUNTER (OUTPATIENT)
Dept: NUCLEAR MEDICINE | Facility: HOSPITAL | Age: 69
Discharge: HOME | End: 2024-09-05
Attending: INTERNAL MEDICINE
Payer: COMMERCIAL

## 2024-09-05 ENCOUNTER — HOSPITAL ENCOUNTER (OUTPATIENT)
Dept: CARDIOLOGY | Facility: HOSPITAL | Age: 69
Discharge: HOME | End: 2024-09-05
Attending: INTERNAL MEDICINE
Payer: COMMERCIAL

## 2024-09-05 DIAGNOSIS — R53.82 CHRONIC FATIGUE: ICD-10-CM

## 2024-09-05 DIAGNOSIS — R55 SYNCOPE, UNSPECIFIED SYNCOPE TYPE: ICD-10-CM

## 2024-09-05 DIAGNOSIS — I15.8 OTHER SECONDARY HYPERTENSION: ICD-10-CM

## 2024-09-05 DIAGNOSIS — Z95.818 IMPLANTABLE LOOP RECORDER PRESENT: ICD-10-CM

## 2024-09-05 DIAGNOSIS — E78.5 HYPERLIPIDEMIA LDL GOAL <70: ICD-10-CM

## 2024-09-05 DIAGNOSIS — R07.2 PRECORDIAL PAIN: ICD-10-CM

## 2024-09-05 LAB
CV STRESS CURRENT BP HE: NORMAL
CV STRESS CURRENT HR HE: 59
CV STRESS CURRENT HR HE: 61
CV STRESS CURRENT HR HE: 62
CV STRESS CURRENT HR HE: 71
CV STRESS CURRENT HR HE: 73
CV STRESS CURRENT HR HE: 73
CV STRESS CURRENT HR HE: 74
CV STRESS CURRENT HR HE: 74
CV STRESS CURRENT HR HE: 76
CV STRESS CURRENT HR HE: 77
CV STRESS CURRENT HR HE: 81
CV STRESS CURRENT HR HE: 81
CV STRESS CURRENT HR HE: 84
CV STRESS CURRENT HR HE: 86
CV STRESS DEVIATION TIME HE: NORMAL
CV STRESS ECHO PERCENT HR HE: NORMAL
CV STRESS EXERCISE STAGE HE: NORMAL
CV STRESS FINAL RESTING BP HE: NORMAL
CV STRESS FINAL RESTING HR HE: 74
CV STRESS MAX HR HE: 87
CV STRESS MAX TREADMILL GRADE HE: 0
CV STRESS MAX TREADMILL SPEED HE: 0
CV STRESS PEAK DIA BP HE: NORMAL
CV STRESS PEAK SYS BP HE: NORMAL
CV STRESS PHASE HE: NORMAL
CV STRESS PROTOCOL HE: NORMAL
CV STRESS RESTING PT POSITION HE: NORMAL
CV STRESS ST DEVIATION AMOUNT HE: NORMAL
CV STRESS ST DEVIATION ELEVATION HE: NORMAL
CV STRESS ST EVELATION AMOUNT HE: NORMAL
CV STRESS TEST TYPE HE: NORMAL
CV STRESS TOTAL STAGE TIME MIN 1 HE: NORMAL
NUC STRESS EJECTION FRACTION: 55 %
RATE PRESSURE PRODUCT: NORMAL
STRESS ECHO BASELINE DIASTOLIC HE: 98
STRESS ECHO BASELINE HR: 60
STRESS ECHO BASELINE SYSTOLIC BP: 173
STRESS ECHO CALCULATED PERCENT HR: 58 %
STRESS ECHO LAST STRESS DIASTOLIC BP: 102
STRESS ECHO LAST STRESS HR: 81
STRESS ECHO LAST STRESS SYSTOLIC BP: 177
STRESS ECHO TARGET HR: 151

## 2024-09-05 PROCEDURE — A9500 TC99M SESTAMIBI: HCPCS | Performed by: INTERNAL MEDICINE

## 2024-09-05 PROCEDURE — 93017 CV STRESS TEST TRACING ONLY: CPT

## 2024-09-05 PROCEDURE — 343N000001 HC RX 343: Performed by: INTERNAL MEDICINE

## 2024-09-05 PROCEDURE — 78452 HT MUSCLE IMAGE SPECT MULT: CPT

## 2024-09-05 PROCEDURE — 250N000011 HC RX IP 250 OP 636: Performed by: INTERNAL MEDICINE

## 2024-09-05 PROCEDURE — 93016 CV STRESS TEST SUPVJ ONLY: CPT | Performed by: INTERNAL MEDICINE

## 2024-09-05 PROCEDURE — 93018 CV STRESS TEST I&R ONLY: CPT | Performed by: INTERNAL MEDICINE

## 2024-09-05 PROCEDURE — 78452 HT MUSCLE IMAGE SPECT MULT: CPT | Mod: 26 | Performed by: INTERNAL MEDICINE

## 2024-09-05 RX ORDER — REGADENOSON 0.08 MG/ML
0.4 INJECTION, SOLUTION INTRAVENOUS ONCE
Status: COMPLETED | OUTPATIENT
Start: 2024-09-05 | End: 2024-09-05

## 2024-09-05 RX ORDER — AMINOPHYLLINE 25 MG/ML
50-100 INJECTION, SOLUTION INTRAVENOUS
Status: DISCONTINUED | OUTPATIENT
Start: 2024-09-05 | End: 2024-09-06 | Stop reason: HOSPADM

## 2024-09-05 RX ADMIN — REGADENOSON 0.4 MG: 0.08 INJECTION, SOLUTION INTRAVENOUS at 09:02

## 2024-09-05 RX ADMIN — Medication 8.9 MILLICURIE: at 07:50

## 2024-09-05 RX ADMIN — Medication 37.1 MILLICURIE: at 09:00

## 2024-09-06 NOTE — TELEPHONE ENCOUNTER
From: Omid Francois MD  Sent: 9/6/2024   7:26 AM CDT  To: Tracey Parra  Subject: FW: Airline Drops                                Thanks

## 2024-10-15 ENCOUNTER — LAB REQUISITION (OUTPATIENT)
Dept: LAB | Facility: CLINIC | Age: 69
End: 2024-10-15
Payer: COMMERCIAL

## 2024-10-15 DIAGNOSIS — Z12.5 ENCOUNTER FOR SCREENING FOR MALIGNANT NEOPLASM OF PROSTATE: ICD-10-CM

## 2024-10-15 DIAGNOSIS — I10 ESSENTIAL (PRIMARY) HYPERTENSION: ICD-10-CM

## 2024-10-15 DIAGNOSIS — E78.2 MIXED HYPERLIPIDEMIA: ICD-10-CM

## 2024-10-15 PROCEDURE — 80048 BASIC METABOLIC PNL TOTAL CA: CPT | Mod: ORL | Performed by: FAMILY MEDICINE

## 2024-10-15 PROCEDURE — 80061 LIPID PANEL: CPT | Mod: ORL | Performed by: FAMILY MEDICINE

## 2024-10-15 PROCEDURE — G0103 PSA SCREENING: HCPCS | Mod: ORL | Performed by: FAMILY MEDICINE

## 2024-10-16 LAB
ANION GAP SERPL CALCULATED.3IONS-SCNC: 10 MMOL/L (ref 7–15)
BUN SERPL-MCNC: 18.2 MG/DL (ref 8–23)
CALCIUM SERPL-MCNC: 9.4 MG/DL (ref 8.8–10.4)
CHLORIDE SERPL-SCNC: 104 MMOL/L (ref 98–107)
CHOLEST SERPL-MCNC: 125 MG/DL
CREAT SERPL-MCNC: 0.84 MG/DL (ref 0.67–1.17)
EGFRCR SERPLBLD CKD-EPI 2021: >90 ML/MIN/1.73M2
FASTING STATUS PATIENT QL REPORTED: ABNORMAL
FASTING STATUS PATIENT QL REPORTED: NORMAL
GLUCOSE SERPL-MCNC: 92 MG/DL (ref 70–99)
HCO3 SERPL-SCNC: 24 MMOL/L (ref 22–29)
HDLC SERPL-MCNC: 31 MG/DL
LDLC SERPL CALC-MCNC: 52 MG/DL
NONHDLC SERPL-MCNC: 94 MG/DL
POTASSIUM SERPL-SCNC: 4 MMOL/L (ref 3.4–5.3)
PSA SERPL DL<=0.01 NG/ML-MCNC: 6.04 NG/ML (ref 0–4.5)
SODIUM SERPL-SCNC: 138 MMOL/L (ref 135–145)
TRIGL SERPL-MCNC: 208 MG/DL

## 2024-11-14 DIAGNOSIS — R53.82 CHRONIC FATIGUE: ICD-10-CM

## 2024-11-14 DIAGNOSIS — Z95.818 IMPLANTABLE LOOP RECORDER PRESENT: ICD-10-CM

## 2024-11-14 RX ORDER — ATENOLOL 25 MG/1
12.5 TABLET ORAL DAILY
Qty: 45 TABLET | Refills: 1 | Status: SHIPPED | OUTPATIENT
Start: 2024-11-14

## 2024-11-20 ENCOUNTER — TELEPHONE (OUTPATIENT)
Dept: CARDIOLOGY | Facility: CLINIC | Age: 69
End: 2024-11-20
Payer: COMMERCIAL

## 2024-11-20 NOTE — TELEPHONE ENCOUNTER
Health Call Center    Phone Message    May a detailed message be left on voicemail: yes     Reason for Call: Other: Patient stated they are looking to get MRI with [employer] and needing approval/clearance from provider due to loop recorder. Patient will like care team to reach out to [employer] to provide information. Please call patient back to further discuss.     Action Taken: Other: Cardiology    Travel Screening: Not Applicable     Thank you!  [facility]

## 2024-11-20 NOTE — TELEPHONE ENCOUNTER
Call placed to [employer] re: Loop recorder and MRI scan on patient behalf. [employer] requesting implant record /device model serial # to be faxed. This was done today. Call placed to patient to let him know he should be cleared and to call us if any issues. Patient appreciates that and will call if any issues.     Elen Narvaez RN

## 2024-11-22 ENCOUNTER — ANCILLARY PROCEDURE (OUTPATIENT)
Dept: CARDIOLOGY | Facility: CLINIC | Age: 69
End: 2024-11-22
Attending: INTERNAL MEDICINE
Payer: COMMERCIAL

## 2024-11-22 DIAGNOSIS — R55 SYNCOPE, UNSPECIFIED SYNCOPE TYPE: ICD-10-CM

## 2024-11-22 DIAGNOSIS — Z95.818 IMPLANTABLE LOOP RECORDER PRESENT: ICD-10-CM

## 2025-01-02 PROCEDURE — 93298 REM INTERROG DEV EVAL SCRMS: CPT | Performed by: INTERNAL MEDICINE

## 2025-03-10 ENCOUNTER — ANCILLARY PROCEDURE (OUTPATIENT)
Dept: CARDIOLOGY | Facility: CLINIC | Age: 70
End: 2025-03-10
Attending: INTERNAL MEDICINE
Payer: COMMERCIAL

## 2025-03-10 DIAGNOSIS — R55 SYNCOPE, UNSPECIFIED SYNCOPE TYPE: ICD-10-CM

## 2025-03-10 DIAGNOSIS — Z95.818 IMPLANTABLE LOOP RECORDER PRESENT: ICD-10-CM

## 2025-03-21 PROCEDURE — 93298 REM INTERROG DEV EVAL SCRMS: CPT | Performed by: INTERNAL MEDICINE

## 2025-05-06 ENCOUNTER — ANCILLARY PROCEDURE (OUTPATIENT)
Dept: CARDIOLOGY | Facility: CLINIC | Age: 70
End: 2025-05-06
Attending: INTERNAL MEDICINE
Payer: COMMERCIAL

## 2025-05-06 ENCOUNTER — TELEPHONE (OUTPATIENT)
Dept: CARDIOLOGY | Facility: CLINIC | Age: 70
End: 2025-05-06

## 2025-05-06 DIAGNOSIS — R55 SYNCOPE, UNSPECIFIED SYNCOPE TYPE: ICD-10-CM

## 2025-05-06 DIAGNOSIS — Z95.818 IMPLANTABLE LOOP RECORDER PRESENT: ICD-10-CM

## 2025-05-06 PROCEDURE — 93298 REM INTERROG DEV EVAL SCRMS: CPT | Performed by: INTERNAL MEDICINE

## 2025-05-06 NOTE — TELEPHONE ENCOUNTER
5/6/25: Called patient to discuss pause episode. Pt was sleeping at the time, no symptoms. He has not been sleeping well due to radiation and chemo therapy for prostate cancer. He gets hot flashes 1-2x every hour. He is using his CPAP faithfully. He reports that the past 2 nights he slept really hard in 2 hour segments. Angy Cannon RN on 5/6/2025 at 8:21 AM        Type: alert remote loop recorder transmission for pause episode.   Device: [employer] LUX-Dx ICM.   Presenting rhythm: Sinus 60 bpm.   Battery status: OK  Arrhythmias: since 3/10/25; One pause episode on 5/5/25 6:47AM duration 3 seconds. ECG shows NSR w/ 1st degree AVB, rate 60 bpm, PAC followed by pause.  Plan: Next remote check scheduled for 8/18/25. Routed to device RN for review. HANNA Castillo, Device Specialist. Add: Remote reviewed. Will call patient, see Epic note. Angy Cannon, Device RN

## 2025-07-13 ENCOUNTER — HEALTH MAINTENANCE LETTER (OUTPATIENT)
Age: 70
End: 2025-07-13

## (undated) DEVICE — SYR 20ML LL W/O NDL 302830

## (undated) DEVICE — BIN-COVIDIEN MESH BIN

## (undated) DEVICE — GLOVE BIOGEL PI MICRO SZ 7.5 48575

## (undated) DEVICE — TRAY PAIN INJECTION 97A 640

## (undated) DEVICE — COVER ULTRASOUND PROBE W/GEL FLEXI-FEEL 6"X58" LF  25-FF658

## (undated) DEVICE — PREP CHLORAPREP W/ORANGE TINT 10.5ML 260715

## (undated) DEVICE — NDL CANNULA SOLOPLEX STIM 21GX100MM 001187-77

## (undated) DEVICE — NDL 25GA 1.5" 305127

## (undated) DEVICE — SYR 05ML LL W/O NDL

## (undated) RX ORDER — LIDOCAINE HYDROCHLORIDE 10 MG/ML
INJECTION, SOLUTION EPIDURAL; INFILTRATION; INTRACAUDAL; PERINEURAL
Status: DISPENSED
Start: 2023-04-25